# Patient Record
Sex: MALE | Race: WHITE | Employment: PART TIME | URBAN - NONMETROPOLITAN AREA
[De-identification: names, ages, dates, MRNs, and addresses within clinical notes are randomized per-mention and may not be internally consistent; named-entity substitution may affect disease eponyms.]

---

## 2017-01-23 RX ORDER — TADALAFIL 5 MG
5 TABLET ORAL DAILY
Qty: 30 TABLET | Refills: 3 | Status: SHIPPED | OUTPATIENT
Start: 2017-01-23 | End: 2019-11-20

## 2017-02-15 ENCOUNTER — TELEPHONE (OUTPATIENT)
Dept: UROLOGY | Age: 72
End: 2017-02-15

## 2017-03-14 ENCOUNTER — TELEPHONE (OUTPATIENT)
Dept: UROLOGY | Age: 72
End: 2017-03-14

## 2017-05-30 ENCOUNTER — TELEPHONE (OUTPATIENT)
Dept: UROLOGY | Age: 72
End: 2017-05-30

## 2017-06-15 ENCOUNTER — PROCEDURE VISIT (OUTPATIENT)
Dept: UROLOGY | Age: 72
End: 2017-06-15

## 2017-06-15 VITALS
WEIGHT: 156 LBS | BODY MASS INDEX: 23.64 KG/M2 | SYSTOLIC BLOOD PRESSURE: 154 MMHG | HEIGHT: 68 IN | DIASTOLIC BLOOD PRESSURE: 80 MMHG

## 2017-06-15 DIAGNOSIS — C61 PROSTATE CANCER (HCC): Primary | ICD-10-CM

## 2017-06-15 PROCEDURE — 99999 PR OFFICE/OUTPT VISIT,PROCEDURE ONLY: CPT | Performed by: UROLOGY

## 2017-06-15 PROCEDURE — 99999 PR SONO GUIDE NEEDLE BIOPSY: CPT | Performed by: UROLOGY

## 2017-06-15 PROCEDURE — 55876 PLACE RT DEVICE/MARKER PROS: CPT | Performed by: UROLOGY

## 2017-06-15 PROCEDURE — 96372 THER/PROPH/DIAG INJ SC/IM: CPT | Performed by: UROLOGY

## 2017-06-15 PROCEDURE — 76942 ECHO GUIDE FOR BIOPSY: CPT | Performed by: UROLOGY

## 2017-06-15 RX ORDER — GENTAMICIN SULFATE 40 MG/ML
80 INJECTION, SOLUTION INTRAMUSCULAR; INTRAVENOUS ONCE
Status: COMPLETED | OUTPATIENT
Start: 2017-06-15 | End: 2017-06-15

## 2017-06-15 RX ADMIN — GENTAMICIN SULFATE 80 MG: 40 INJECTION, SOLUTION INTRAMUSCULAR; INTRAVENOUS at 11:05

## 2017-07-17 ENCOUNTER — APPOINTMENT (OUTPATIENT)
Dept: RADIATION ONCOLOGY | Age: 72
End: 2017-07-17
Payer: COMMERCIAL

## 2017-07-17 ENCOUNTER — HOSPITAL ENCOUNTER (OUTPATIENT)
Dept: RADIATION ONCOLOGY | Age: 72
Discharge: HOME | End: 2017-07-17
Payer: COMMERCIAL

## 2017-07-17 PROCEDURE — 77385 HC NTSTY MODUL RAD TX DLVR SMPL: CPT | Performed by: RADIOLOGY

## 2017-07-17 PROCEDURE — 77336 RADIATION PHYSICS CONSULT: CPT | Performed by: RADIOLOGY

## 2017-07-18 ENCOUNTER — HOSPITAL ENCOUNTER (OUTPATIENT)
Dept: RADIATION ONCOLOGY | Age: 72
Discharge: HOME | End: 2017-07-18
Payer: COMMERCIAL

## 2017-07-18 PROCEDURE — 77385 HC NTSTY MODUL RAD TX DLVR SMPL: CPT | Performed by: RADIOLOGY

## 2017-07-19 ENCOUNTER — APPOINTMENT (OUTPATIENT)
Dept: RADIATION ONCOLOGY | Age: 72
End: 2017-07-19
Payer: COMMERCIAL

## 2017-07-19 PROCEDURE — 77385 HC NTSTY MODUL RAD TX DLVR SMPL: CPT | Performed by: RADIOLOGY

## 2017-07-20 ENCOUNTER — APPOINTMENT (OUTPATIENT)
Dept: RADIATION ONCOLOGY | Age: 72
End: 2017-07-20
Payer: COMMERCIAL

## 2017-07-20 PROCEDURE — 77385 HC NTSTY MODUL RAD TX DLVR SMPL: CPT | Performed by: RADIOLOGY

## 2017-07-21 ENCOUNTER — APPOINTMENT (OUTPATIENT)
Dept: RADIATION ONCOLOGY | Age: 72
End: 2017-07-21
Payer: COMMERCIAL

## 2017-07-21 PROCEDURE — 77385 HC NTSTY MODUL RAD TX DLVR SMPL: CPT | Performed by: RADIOLOGY

## 2017-07-24 ENCOUNTER — HOSPITAL ENCOUNTER (OUTPATIENT)
Dept: RADIATION ONCOLOGY | Age: 72
Discharge: HOME | End: 2017-07-24
Payer: COMMERCIAL

## 2017-07-24 PROCEDURE — 77336 RADIATION PHYSICS CONSULT: CPT | Performed by: RADIOLOGY

## 2017-07-24 PROCEDURE — 77385 HC NTSTY MODUL RAD TX DLVR SMPL: CPT | Performed by: RADIOLOGY

## 2017-07-25 PROCEDURE — 77385 HC NTSTY MODUL RAD TX DLVR SMPL: CPT | Performed by: RADIOLOGY

## 2017-07-26 PROCEDURE — 77385 HC NTSTY MODUL RAD TX DLVR SMPL: CPT | Performed by: RADIOLOGY

## 2017-07-27 PROCEDURE — 77385 HC NTSTY MODUL RAD TX DLVR SMPL: CPT | Performed by: RADIOLOGY

## 2017-07-28 PROCEDURE — 77385 HC NTSTY MODUL RAD TX DLVR SMPL: CPT | Performed by: RADIOLOGY

## 2017-07-31 ENCOUNTER — HOSPITAL ENCOUNTER (OUTPATIENT)
Dept: RADIATION ONCOLOGY | Age: 72
Discharge: HOME | End: 2017-07-31
Payer: COMMERCIAL

## 2017-07-31 PROCEDURE — 77336 RADIATION PHYSICS CONSULT: CPT | Performed by: RADIOLOGY

## 2017-07-31 PROCEDURE — 77385 HC NTSTY MODUL RAD TX DLVR SMPL: CPT | Performed by: RADIOLOGY

## 2017-08-01 PROCEDURE — 77300 RADIATION THERAPY DOSE PLAN: CPT | Performed by: RADIOLOGY

## 2017-08-01 PROCEDURE — 77385 HC NTSTY MODUL RAD TX DLVR SMPL: CPT | Performed by: RADIOLOGY

## 2017-08-02 PROCEDURE — 77385 HC NTSTY MODUL RAD TX DLVR SMPL: CPT | Performed by: RADIOLOGY

## 2017-08-03 PROCEDURE — 77385 HC NTSTY MODUL RAD TX DLVR SMPL: CPT | Performed by: RADIOLOGY

## 2017-08-04 PROCEDURE — 77385 HC NTSTY MODUL RAD TX DLVR SMPL: CPT | Performed by: RADIOLOGY

## 2017-08-07 ENCOUNTER — APPOINTMENT (OUTPATIENT)
Dept: RADIATION ONCOLOGY | Age: 72
End: 2017-08-07
Payer: COMMERCIAL

## 2017-08-07 ENCOUNTER — HOSPITAL ENCOUNTER (OUTPATIENT)
Dept: RADIATION ONCOLOGY | Age: 72
Discharge: HOME | End: 2017-08-07
Payer: COMMERCIAL

## 2017-08-07 PROCEDURE — 77338 DESIGN MLC DEVICE FOR IMRT: CPT | Performed by: RADIOLOGY

## 2017-08-07 PROCEDURE — 77336 RADIATION PHYSICS CONSULT: CPT | Performed by: RADIOLOGY

## 2017-08-07 PROCEDURE — 77385 HC NTSTY MODUL RAD TX DLVR SMPL: CPT | Performed by: RADIOLOGY

## 2017-08-08 ENCOUNTER — APPOINTMENT (OUTPATIENT)
Dept: RADIATION ONCOLOGY | Age: 72
End: 2017-08-08
Payer: COMMERCIAL

## 2017-08-08 PROCEDURE — 77385 HC NTSTY MODUL RAD TX DLVR SMPL: CPT | Performed by: RADIOLOGY

## 2017-08-09 ENCOUNTER — APPOINTMENT (OUTPATIENT)
Dept: RADIATION ONCOLOGY | Age: 72
End: 2017-08-09
Payer: COMMERCIAL

## 2017-08-09 PROCEDURE — 77385 HC NTSTY MODUL RAD TX DLVR SMPL: CPT | Performed by: RADIOLOGY

## 2017-08-10 ENCOUNTER — APPOINTMENT (OUTPATIENT)
Dept: RADIATION ONCOLOGY | Age: 72
End: 2017-08-10
Payer: COMMERCIAL

## 2017-08-10 PROCEDURE — 77385 HC NTSTY MODUL RAD TX DLVR SMPL: CPT | Performed by: RADIOLOGY

## 2017-08-11 ENCOUNTER — APPOINTMENT (OUTPATIENT)
Dept: RADIATION ONCOLOGY | Age: 72
End: 2017-08-11
Payer: COMMERCIAL

## 2017-08-11 PROCEDURE — 77385 HC NTSTY MODUL RAD TX DLVR SMPL: CPT | Performed by: RADIOLOGY

## 2017-08-14 ENCOUNTER — APPOINTMENT (OUTPATIENT)
Dept: RADIATION ONCOLOGY | Age: 72
End: 2017-08-14
Payer: COMMERCIAL

## 2017-08-14 ENCOUNTER — HOSPITAL ENCOUNTER (OUTPATIENT)
Dept: RADIATION ONCOLOGY | Age: 72
Discharge: HOME | End: 2017-08-14
Payer: COMMERCIAL

## 2017-08-14 PROCEDURE — 77385 HC NTSTY MODUL RAD TX DLVR SMPL: CPT | Performed by: RADIOLOGY

## 2017-08-14 PROCEDURE — 77336 RADIATION PHYSICS CONSULT: CPT | Performed by: RADIOLOGY

## 2017-08-15 ENCOUNTER — APPOINTMENT (OUTPATIENT)
Dept: RADIATION ONCOLOGY | Age: 72
End: 2017-08-15
Payer: COMMERCIAL

## 2017-08-15 PROCEDURE — 77385 HC NTSTY MODUL RAD TX DLVR SMPL: CPT | Performed by: RADIOLOGY

## 2017-08-16 ENCOUNTER — APPOINTMENT (OUTPATIENT)
Dept: RADIATION ONCOLOGY | Age: 72
End: 2017-08-16
Payer: COMMERCIAL

## 2017-08-16 PROCEDURE — 77385 HC NTSTY MODUL RAD TX DLVR SMPL: CPT | Performed by: RADIOLOGY

## 2017-08-17 ENCOUNTER — APPOINTMENT (OUTPATIENT)
Dept: RADIATION ONCOLOGY | Age: 72
End: 2017-08-17
Payer: COMMERCIAL

## 2017-08-18 ENCOUNTER — APPOINTMENT (OUTPATIENT)
Dept: RADIATION ONCOLOGY | Age: 72
End: 2017-08-18
Payer: COMMERCIAL

## 2017-08-21 ENCOUNTER — APPOINTMENT (OUTPATIENT)
Dept: RADIATION ONCOLOGY | Age: 72
End: 2017-08-21
Payer: COMMERCIAL

## 2017-08-21 ENCOUNTER — HOSPITAL ENCOUNTER (OUTPATIENT)
Dept: RADIATION ONCOLOGY | Age: 72
Discharge: HOME | End: 2017-08-21
Payer: COMMERCIAL

## 2017-08-21 PROCEDURE — 77385 HC NTSTY MODUL RAD TX DLVR SMPL: CPT | Performed by: RADIOLOGY

## 2017-08-22 ENCOUNTER — APPOINTMENT (OUTPATIENT)
Dept: RADIATION ONCOLOGY | Age: 72
End: 2017-08-22
Payer: COMMERCIAL

## 2017-08-22 PROCEDURE — 77385 HC NTSTY MODUL RAD TX DLVR SMPL: CPT | Performed by: RADIOLOGY

## 2017-08-23 ENCOUNTER — APPOINTMENT (OUTPATIENT)
Dept: RADIATION ONCOLOGY | Age: 72
End: 2017-08-23
Payer: COMMERCIAL

## 2017-08-23 PROCEDURE — 77385 HC NTSTY MODUL RAD TX DLVR SMPL: CPT | Performed by: RADIOLOGY

## 2017-08-24 ENCOUNTER — APPOINTMENT (OUTPATIENT)
Dept: RADIATION ONCOLOGY | Age: 72
End: 2017-08-24
Payer: COMMERCIAL

## 2017-08-24 PROCEDURE — 77385 HC NTSTY MODUL RAD TX DLVR SMPL: CPT | Performed by: RADIOLOGY

## 2017-08-25 PROCEDURE — 77385 HC NTSTY MODUL RAD TX DLVR SMPL: CPT | Performed by: RADIOLOGY

## 2017-08-25 PROCEDURE — 77336 RADIATION PHYSICS CONSULT: CPT | Performed by: RADIOLOGY

## 2017-10-03 ENCOUNTER — HOSPITAL ENCOUNTER (OUTPATIENT)
Dept: RADIATION ONCOLOGY | Age: 72
Discharge: HOME | End: 2017-10-03
Payer: COMMERCIAL

## 2017-10-03 PROCEDURE — 99211 OFF/OP EST MAY X REQ PHY/QHP: CPT

## 2018-04-02 ENCOUNTER — HOSPITAL ENCOUNTER (OUTPATIENT)
Dept: RADIATION ONCOLOGY | Age: 73
Discharge: HOME | End: 2018-04-02
Payer: COMMERCIAL

## 2018-04-02 PROCEDURE — 99211 OFF/OP EST MAY X REQ PHY/QHP: CPT

## 2018-05-16 ENCOUNTER — OFFICE VISIT (OUTPATIENT)
Dept: FAMILY MEDICINE CLINIC | Age: 73
End: 2018-05-16
Payer: COMMERCIAL

## 2018-05-16 VITALS
SYSTOLIC BLOOD PRESSURE: 138 MMHG | RESPIRATION RATE: 12 BRPM | HEIGHT: 68 IN | BODY MASS INDEX: 24.89 KG/M2 | WEIGHT: 164.2 LBS | DIASTOLIC BLOOD PRESSURE: 70 MMHG | HEART RATE: 80 BPM

## 2018-05-16 DIAGNOSIS — C61 PROSTATE CANCER (HCC): ICD-10-CM

## 2018-05-16 DIAGNOSIS — Z00.00 ENCOUNTER FOR ANNUAL PHYSICAL EXAM: Primary | ICD-10-CM

## 2018-05-16 DIAGNOSIS — Z23 NEED FOR PNEUMOCOCCAL VACCINATION: ICD-10-CM

## 2018-05-16 DIAGNOSIS — I10 ESSENTIAL HYPERTENSION: ICD-10-CM

## 2018-05-16 LAB
BILIRUBIN URINE: NEGATIVE
BLOOD URINE, POC: NEGATIVE
CHARACTER, URINE: CLEAR
COLOR, URINE: YELLOW
GLUCOSE URINE: NEGATIVE MG/DL
KETONES, URINE: NEGATIVE
LEUKOCYTE CLUMPS, URINE: NEGATIVE
NITRITE, URINE: NEGATIVE
PH, URINE: 6
PROTEIN, URINE: NEGATIVE MG/DL
SPECIFIC GRAVITY, URINE: 1.02 (ref 1–1.03)
UROBILINOGEN, URINE: 0.2 EU/DL

## 2018-05-16 PROCEDURE — 81003 URINALYSIS AUTO W/O SCOPE: CPT | Performed by: NURSE PRACTITIONER

## 2018-05-16 PROCEDURE — 90471 IMMUNIZATION ADMIN: CPT | Performed by: NURSE PRACTITIONER

## 2018-05-16 PROCEDURE — 99397 PER PM REEVAL EST PAT 65+ YR: CPT | Performed by: NURSE PRACTITIONER

## 2018-05-16 PROCEDURE — 90670 PCV13 VACCINE IM: CPT | Performed by: NURSE PRACTITIONER

## 2018-05-16 RX ORDER — BICALUTAMIDE 50 MG/1
50 TABLET, FILM COATED ORAL DAILY
COMMUNITY
End: 2019-11-20

## 2018-05-16 ASSESSMENT — PATIENT HEALTH QUESTIONNAIRE - PHQ9
SUM OF ALL RESPONSES TO PHQ QUESTIONS 1-9: 0
SUM OF ALL RESPONSES TO PHQ9 QUESTIONS 1 & 2: 0
1. LITTLE INTEREST OR PLEASURE IN DOING THINGS: 0
2. FEELING DOWN, DEPRESSED OR HOPELESS: 0

## 2018-10-18 ENCOUNTER — HOSPITAL ENCOUNTER (OUTPATIENT)
Dept: RADIATION ONCOLOGY | Age: 73
Discharge: HOME | End: 2018-10-18
Payer: COMMERCIAL

## 2018-10-18 PROCEDURE — 99211 OFF/OP EST MAY X REQ PHY/QHP: CPT

## 2019-05-03 ENCOUNTER — HOSPITAL ENCOUNTER (OUTPATIENT)
Dept: RADIATION ONCOLOGY | Age: 74
Discharge: HOME | End: 2019-05-03
Payer: COMMERCIAL

## 2019-05-03 PROCEDURE — 99211 OFF/OP EST MAY X REQ PHY/QHP: CPT | Performed by: NURSE PRACTITIONER

## 2019-05-03 NOTE — PROGRESS NOTES
[address] [facility] [address], [address]  Phone: [phone]   Toll Free: [phone]   Fax: [phone]    RADIATION ONCOLOGY FOLLOW UP REPORT    PATIENT NAME:  Adiel Thornton     : 1945  MEDICAL RECORD NO: [id number]    LOCATION: [facility] NO: [id number]      PROVIDER: AXEL Tobar CNP        DATE OF SERVICE: 5/3/2019    FOLLOW UP PHYSICIANS: Dr. Kellogg ([facility]), Dr. Leandro Torres CNP     DIAGNOSIS: C61 adenocarcinoma of the prostate, [id number], Stage III, PSA at diagnosis 11.22, Thomas score 4+3=7, with extraprostatic extension, perineural invasion, positive margin and detectable and variable PSA     DATE OF DIAGNOSIS: 2015     END OF TREATMENT DATE: 2017     ECOG PERFORMANCE STATUS: 0     PAIN: Denies     CHAPERONE: Declined        HPI: Palomo is an active 73year old male who had an elevated PSA in  of 11.22. A biopsy was recommended and completed 2015.  This showed adenocarcinoma the prostate Thomas 3+4 = 7 involving 9/12 cores including all core specimens from the left prostate and 3/6 cores from the right prostate. Elmo Ko received a recommendation for a laparoscopic robotic prostatectomy which was performed 2015.  Path revealed extraprostatic extension and positive surgical margins. Four right pelvic lymph nodes and 5 left pelvic lymph nodes were sampled all which were negative for malignancy.  Postoperative PSA was detectable 0.05. The PSA had been quite variable since that time, measuring as low as 0.02 and as high as 0.09.  PSA from 3/15/2017 was 0.08, pretreatment PSA 2017 was 0.05.  Palomo received a recommendation for salvage radiation therapy.  After discussion about the role of radiation therapy for his prostate cancer Kathryn Mckeon decided to undergo treatment. He did not have any unexpected side effects related to his radiation treatment.       INTERVAL HISTORY: Elmo Ko returns to [facility] today for a post treatment check up as part of his survivorship care after undergoing salvage EBRT for prostate cancer. Jessica Ryan states he has been doing well since completing treatment. He continues to have discomfort in the right hip. He has seen doctors at [facility] and [facility] about hip replacement, he has received some conflicting information between the 2 places and is considering another opinion prior to making any decisions. He states he continues to struggle with his energy level and is hoping it will improve once his testosterone level rises. In regards to urination he admits to having nocturia twice per night, denies leaking. He states the urgency and frequency fluctuates day to day some days are better than others. He denies blood or pain with urination or bowel movements. He voiced the erectile dysfunction continues to be a concern for him. He has tried oral medications for this which have not been very effective. He has also tried trimix and did have some results with this but did not like using it. He plans to discuss any other options with Dr. Anthony Ramos in June at his follow up. AUA Score: 11 (Moderate)  Quality of Life: 2 (Mostly satisfied)  RYAN Inventory: 1 (Severe ED)      TESTS:   12/12/18 PSA: <0.04  7/18/18 PSA: <0.04  3/28/18 PSA: <0.04      MEDICATIONS:   Current Outpatient Medications   Medication Sig Dispense Refill    bicalutamide (CASODEX) 50 MG chemo tablet Take 50 mg by mouth daily      CIALIS 5 MG tablet Take 1 tablet by mouth daily 30 tablet 3    sildenafil (VIAGRA) 50 MG tablet Take 2 tablets by mouth as needed for Erectile Dysfunction 3 tablet 0    Multiple Vitamins-Minerals (MULTIVITAMIN PO) Take by mouth       No current facility-administered medications for this encounter. ROS: As noted in the HPI and Interval history, otherwise negative. EXAMINATION:   GENERAL: Jessica Ryan is a pleasant well developed adult male.  He is alert and oriented x3, in no acute distress. VITAL SIGNS:  Weight 74.8 kg, temperature 35.8°C, pulse 62, blood pressure 143/71, respirations 18, pulse ox 97% on room air. HEENT: Normocephalic, atraumatic. PERRL, EOMI. Ears, nose and lips within normal limits. Oropharynx unremarkable. NECK: Without palpable adenopathy. Supple  LYMPH: Without palpable supraclavicular or axillary adenopathy. LUNGS: Clear throughout no adventitious sounds. HEART: Regular rate and rhythm. ABDOMEN: Soft, nontender. Active bowel sounds x4  EXTREMITIES: Without clubbing or cyanosis. NEUROLOGIC EXAMINATION: Cranial nerves grossly intact. : Without CVA tenderness. SKIN: Skin in treated area without breakdown, erythema or dryness. MUSCULOSKELETAL: Gait steady, without bony point tenderness. ASSESSMENT AND PLAN: Taylor Chavira continues to be doing well since completing radiation treatment. He is s/p RALRP in 2015. With an increasing PSA in 2017 when he started on ADT and underwent XRT.      He received last Eligard injection in July 2018. He stopped taking Casodex as directed on October 1 2018. His testosterone level in December was 56. He continues to struggle with ED and has not had success with oral treatment for this. He has tried trimix with some success but did not like using it. Taylor Chavira states he as the urge to want to perform but the functionality is the problem. He plans to discuss this further with Dr. Cordell Morel at his visit in June. In regards to his prostate cancer at this time his PSA remains undetectable at <0.04. This demonstrates good control of the cancer at this time. His next PSA is scheduled in June prior to appointment with Dr. Cordell Morel. Plan:  1. PSA in December remains stable at <0.04. Next PSA scheduled for June. 2. He has completed ADT: eligard last injection was in July, 2018 and casodex stopped October 1st, 2018. 3. Has an appointment at [facility] in June with repeat PSA and testosterone levels.    4. ED: follows with Urology at [facility]. Has tried multiple treatment modalities but has not had much success with them. Plans to discuss further with [facility] in June. 5. Right hip pain: has seen ortho feel this is due to previous hip replacement 17 years ago and may need replacing. Looking for another opinion has he has received 2 different opinions from [facility]note 3] and [facility]. 6. Continue follow up as scheduled with other providers. 7. Follow up here in September to offset appointment with [facility]. Nany Addison is aware he can call for any questions, concerns or changes in condition prior to his next visit.                Electronically signed by AXEL Nino CNP on 5/3/19 at 1:34 PM

## 2019-09-12 ENCOUNTER — HOSPITAL ENCOUNTER (OUTPATIENT)
Dept: RADIATION ONCOLOGY | Age: 74
Discharge: HOME | End: 2019-09-12
Payer: COMMERCIAL

## 2019-09-12 VITALS
RESPIRATION RATE: 16 BRPM | TEMPERATURE: 96.4 F | DIASTOLIC BLOOD PRESSURE: 90 MMHG | HEART RATE: 71 BPM | BODY MASS INDEX: 25.64 KG/M2 | SYSTOLIC BLOOD PRESSURE: 153 MMHG | OXYGEN SATURATION: 96 % | WEIGHT: 166.23 LBS

## 2019-09-12 PROCEDURE — 99212 OFFICE O/P EST SF 10 MIN: CPT | Performed by: NURSE PRACTITIONER

## 2019-09-12 NOTE — PROGRESS NOTES
[place] a post treatment check up as part of his survivorship care after undergoing salvage EBRT for prostate cancer. Delta Nascimento states he continues to be doing well since completing treatment. He does admit to continued issues with fatigue. Discussed following with physical therapy to help with this he would like to discuss it with his wife first. He continues to have issues with pain tot he right hip and is scheduled to undergo right hip replacement on December 2nd. In regards to urination he admits to having nocturia twice per night, denies leaking. He states the urgency and frequency fluctuates day to day some days are better than others. He denies blood or pain with urination or bowel movements. He voiced the erectile dysfunction continues to be a concern for him. He has tried oral medications for this which have not been very effective. He has also tried trimix and did have some results with this but did not like using it. AUA Score: 6 (Mild)  Quality of Life: 2 (Mostly satisfied)  RYAN Inventory: 1 (Severe ED)    LAB RESULTS:   PSA since completing radiation treatment:   6/19/19: <0.04  12/12/18: <0.04  7/11/18: <0.04  3/28/18: <0.04  8/30/17: <0.04    Testosterone levels:   6/19/49: 373  12/12/18: 56  8/30/17: 36    RADIOLOGY RESULTS:   7/1/19: CT Right lower extremity:   IMPRESSION:     Exam is limited by metal artifact.     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     Chronic comminuted ununited fractures of the right inferior pubic ramus.       7/23/19: CT Pelvis:   IMPRESSION:     1.  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  Right side is complicated with periprosthetic     fracture (inferior to the acetabular cup).         MEDICATIONS:   Current Outpatient Medications   Medication Sig Dispense Refill    bicalutamide (CASODEX) 50 MG chemo tablet Take 50 mg by mouth daily      CIALIS 5 MG tablet Take 1 tablet by mouth daily 30 tablet 3    sildenafil (VIAGRA) 50 MG tablet Take 2 tablets by mouth as needed for Erectile Dysfunction 3 tablet 0    Multiple Vitamins-Minerals (MULTIVITAMIN PO) Take by mouth       No current facility-administered medications for this encounter. ROS: As noted in the HPI and Interval history, otherwise negative. EXAMINATION:   GENERAL: Joe Mazariegos is a pleasant well developed adult male. He is alert and oriented x3, in no acute distress. VITAL SIGNS:  Weight 74.8 kg, temperature 35.8°C, pulse 62, blood pressure 143/71, respirations 18, pulse ox 97% on room air. HEENT: Normocephalic, atraumatic. PERRL, EOMI. Ears, nose and lips within normal limits. Oropharynx unremarkable. NECK: Without palpable adenopathy. Supple  LYMPH: Without palpable supraclavicular or axillary adenopathy. LUNGS: Clear throughout no adventitious sounds. HEART: Regular rate and rhythm. ABDOMEN: Soft, nontender. Active bowel sounds x4  EXTREMITIES: Without clubbing or cyanosis. NEUROLOGIC EXAMINATION: Cranial nerves grossly intact. : Without CVA tenderness. SKIN: Skin in treated area without breakdown, erythema or dryness. MUSCULOSKELETAL: Gait steady, without bony point tenderness.             ASSESSMENT AND PLAN: Joe Mazariegos continues to be doing well since completing radiation treatment. He is s/p RALRP in 2015. With an increasing PSA in 2017 when he started on ADT and underwent XRT.       He received last Eligard injection in July 2018. He stopped taking Casodex as directed on October 1 2018. His testosterone level in December was 64 and most recently increased to 373 in June 2019. He continues to struggle with ED and has not had success with oral treatment for this.  He has tried trimix

## 2019-11-20 ENCOUNTER — OFFICE VISIT (OUTPATIENT)
Dept: FAMILY MEDICINE CLINIC | Age: 74
End: 2019-11-20
Payer: COMMERCIAL

## 2019-11-20 ENCOUNTER — TELEPHONE (OUTPATIENT)
Dept: FAMILY MEDICINE CLINIC | Age: 74
End: 2019-11-20

## 2019-11-20 ENCOUNTER — NURSE ONLY (OUTPATIENT)
Dept: LAB | Age: 74
End: 2019-11-20

## 2019-11-20 ENCOUNTER — HOSPITAL ENCOUNTER (OUTPATIENT)
Age: 74
Discharge: HOME | End: 2019-11-20
Payer: COMMERCIAL

## 2019-11-20 VITALS
DIASTOLIC BLOOD PRESSURE: 88 MMHG | RESPIRATION RATE: 10 BRPM | HEART RATE: 72 BPM | BODY MASS INDEX: 26.37 KG/M2 | SYSTOLIC BLOOD PRESSURE: 132 MMHG | WEIGHT: 168 LBS | HEIGHT: 67 IN

## 2019-11-20 DIAGNOSIS — I48.91 ATRIAL FIBRILLATION, UNSPECIFIED TYPE (HCC): Primary | ICD-10-CM

## 2019-11-20 DIAGNOSIS — M16.10 ARTHRITIS, HIP: ICD-10-CM

## 2019-11-20 DIAGNOSIS — M16.10 ARTHRITIS, HIP: Primary | ICD-10-CM

## 2019-11-20 DIAGNOSIS — Z01.818 PRE-OP EXAM: ICD-10-CM

## 2019-11-20 LAB
ANION GAP SERPL CALCULATED.3IONS-SCNC: 11 MEQ/L (ref 8–16)
APTT: 36.2 SECONDS (ref 22–38)
AVERAGE GLUCOSE: 96 MG/DL (ref 70–126)
BASOPHILS # BLD: 0.7 %
BASOPHILS ABSOLUTE: 0 THOU/MM3 (ref 0–0.1)
BUN BLDV-MCNC: 16 MG/DL (ref 7–22)
CALCIUM SERPL-MCNC: 9.2 MG/DL (ref 8.5–10.5)
CHLORIDE BLD-SCNC: 103 MEQ/L (ref 98–111)
CO2: 26 MEQ/L (ref 23–33)
CREAT SERPL-MCNC: 0.8 MG/DL (ref 0.4–1.2)
EKG ATRIAL RATE: 576 BPM
EKG Q-T INTERVAL: 448 MS
EKG QRS DURATION: 80 MS
EKG QTC CALCULATION (BAZETT): 428 MS
EKG R AXIS: 32 DEGREES
EKG T AXIS: 21 DEGREES
EKG VENTRICULAR RATE: 55 BPM
EOSINOPHIL # BLD: 2.5 %
EOSINOPHILS ABSOLUTE: 0.1 THOU/MM3 (ref 0–0.4)
ERYTHROCYTE [DISTWIDTH] IN BLOOD BY AUTOMATED COUNT: 12.9 % (ref 11.5–14.5)
ERYTHROCYTE [DISTWIDTH] IN BLOOD BY AUTOMATED COUNT: 42.4 FL (ref 35–45)
GFR SERPL CREATININE-BSD FRML MDRD: > 90 ML/MIN/1.73M2
GLUCOSE BLD-MCNC: 84 MG/DL (ref 70–108)
HBA1C MFR BLD: 5.2 % (ref 4.4–6.4)
HCT VFR BLD CALC: 46.9 % (ref 42–52)
HEMOGLOBIN: 14.9 GM/DL (ref 14–18)
IMMATURE GRANS (ABS): 0.02 THOU/MM3 (ref 0–0.07)
IMMATURE GRANULOCYTES: 0.4 %
INR BLD: 1.03 (ref 0.85–1.13)
LYMPHOCYTES # BLD: 13.1 %
LYMPHOCYTES ABSOLUTE: 0.7 THOU/MM3 (ref 1–4.8)
MCH RBC QN AUTO: 28.8 PG (ref 26–33)
MCHC RBC AUTO-ENTMCNC: 31.8 GM/DL (ref 32.2–35.5)
MCV RBC AUTO: 90.7 FL (ref 80–94)
MONOCYTES # BLD: 9.7 %
MONOCYTES ABSOLUTE: 0.5 THOU/MM3 (ref 0.4–1.3)
NUCLEATED RED BLOOD CELLS: 0 /100 WBC
PLATELET # BLD: 189 THOU/MM3 (ref 130–400)
PMV BLD AUTO: 9.6 FL (ref 9.4–12.4)
POTASSIUM SERPL-SCNC: 4.6 MEQ/L (ref 3.5–5.2)
RBC # BLD: 5.17 MILL/MM3 (ref 4.7–6.1)
SEG NEUTROPHILS: 73.6 %
SEGMENTED NEUTROPHILS ABSOLUTE COUNT: 4.1 THOU/MM3 (ref 1.8–7.7)
SODIUM BLD-SCNC: 140 MEQ/L (ref 135–145)
WBC # BLD: 5.6 THOU/MM3 (ref 4.8–10.8)

## 2019-11-20 PROCEDURE — 99214 OFFICE O/P EST MOD 30 MIN: CPT | Performed by: NURSE PRACTITIONER

## 2019-11-20 PROCEDURE — 93005 ELECTROCARDIOGRAM TRACING: CPT | Performed by: NURSE PRACTITIONER

## 2019-11-20 ASSESSMENT — PATIENT HEALTH QUESTIONNAIRE - PHQ9
SUM OF ALL RESPONSES TO PHQ9 QUESTIONS 1 & 2: 0
SUM OF ALL RESPONSES TO PHQ QUESTIONS 1-9: 0
1. LITTLE INTEREST OR PLEASURE IN DOING THINGS: 0
2. FEELING DOWN, DEPRESSED OR HOPELESS: 0
SUM OF ALL RESPONSES TO PHQ QUESTIONS 1-9: 0

## 2020-02-27 ENCOUNTER — APPOINTMENT (OUTPATIENT)
Dept: GENERAL RADIOLOGY | Age: 75
End: 2020-02-27
Payer: MEDICARE

## 2020-02-27 ENCOUNTER — HOSPITAL ENCOUNTER (EMERGENCY)
Age: 75
Discharge: HOME | End: 2020-02-27
Attending: EMERGENCY MEDICINE
Payer: MEDICARE

## 2020-02-27 VITALS
DIASTOLIC BLOOD PRESSURE: 89 MMHG | BODY MASS INDEX: 25.01 KG/M2 | RESPIRATION RATE: 16 BRPM | TEMPERATURE: 98 F | OXYGEN SATURATION: 96 % | HEART RATE: 60 BPM | WEIGHT: 165 LBS | SYSTOLIC BLOOD PRESSURE: 143 MMHG | HEIGHT: 68 IN

## 2020-02-27 PROCEDURE — 6360000002 HC RX W HCPCS: Performed by: EMERGENCY MEDICINE

## 2020-02-27 PROCEDURE — 73502 X-RAY EXAM HIP UNI 2-3 VIEWS: CPT

## 2020-02-27 PROCEDURE — 27250 TREAT HIP DISLOCATION: CPT

## 2020-02-27 PROCEDURE — 94761 N-INVAS EAR/PLS OXIMETRY MLT: CPT

## 2020-02-27 PROCEDURE — 94770 HC ETCO2 MONITOR DAILY: CPT

## 2020-02-27 PROCEDURE — 2709999900 HC NON-CHARGEABLE SUPPLY

## 2020-02-27 PROCEDURE — 96376 TX/PRO/DX INJ SAME DRUG ADON: CPT

## 2020-02-27 PROCEDURE — 96375 TX/PRO/DX INJ NEW DRUG ADDON: CPT

## 2020-02-27 PROCEDURE — 2700000000 HC OXYGEN THERAPY PER DAY

## 2020-02-27 PROCEDURE — 99285 EMERGENCY DEPT VISIT HI MDM: CPT

## 2020-02-27 PROCEDURE — 96374 THER/PROPH/DIAG INJ IV PUSH: CPT

## 2020-02-27 RX ORDER — FENTANYL CITRATE 50 UG/ML
50 INJECTION, SOLUTION INTRAMUSCULAR; INTRAVENOUS EVERY 30 MIN PRN
Status: COMPLETED | OUTPATIENT
Start: 2020-02-27 | End: 2020-02-27

## 2020-02-27 RX ORDER — PROPOFOL 10 MG/ML
INJECTION, EMULSION INTRAVENOUS DAILY PRN
Status: COMPLETED | OUTPATIENT
Start: 2020-02-27 | End: 2020-02-27

## 2020-02-27 RX ORDER — PROPOFOL 10 MG/ML
1 INJECTION, EMULSION INTRAVENOUS ONCE
Status: DISCONTINUED | OUTPATIENT
Start: 2020-02-27 | End: 2020-02-28 | Stop reason: HOSPADM

## 2020-02-27 RX ORDER — PROPOFOL 10 MG/ML
INJECTION, EMULSION INTRAVENOUS
Status: DISCONTINUED
Start: 2020-02-27 | End: 2020-02-28 | Stop reason: HOSPADM

## 2020-02-27 RX ADMIN — FENTANYL CITRATE 50 MCG: 50 INJECTION, SOLUTION INTRAMUSCULAR; INTRAVENOUS at 20:25

## 2020-02-27 RX ADMIN — PROPOFOL 75 MG: 10 INJECTION, EMULSION INTRAVENOUS at 20:25

## 2020-02-27 RX ADMIN — PROPOFOL 75 MG: 10 INJECTION, EMULSION INTRAVENOUS at 20:20

## 2020-02-27 RX ADMIN — FENTANYL CITRATE 50 MCG: 50 INJECTION, SOLUTION INTRAMUSCULAR; INTRAVENOUS at 20:15

## 2020-02-27 ASSESSMENT — ENCOUNTER SYMPTOMS
BACK PAIN: 0
DIARRHEA: 0
ABDOMINAL PAIN: 0
BLOOD IN STOOL: 0
SHORTNESS OF BREATH: 0
WHEEZING: 0
COUGH: 0
VOMITING: 0
SORE THROAT: 0
NAUSEA: 0

## 2020-02-27 ASSESSMENT — PAIN DESCRIPTION - LOCATION: LOCATION: HIP

## 2020-02-27 ASSESSMENT — PAIN DESCRIPTION - PAIN TYPE: TYPE: ACUTE PAIN

## 2020-02-27 ASSESSMENT — PAIN SCALES - GENERAL
PAINLEVEL_OUTOF10: 2

## 2020-02-27 ASSESSMENT — PAIN DESCRIPTION - ORIENTATION: ORIENTATION: RIGHT

## 2020-02-27 NOTE — CONSULTS
05/27/2016     PT/INR:    Lab Results   Component Value Date    INR 1.03 11/20/2019     Troponin:  No results found for: TROPONINI  No results for input(s): LIPASE, AMYLASE in the last 72 hours. No results for input(s): AST, ALT, BILIDIR, BILITOT, ALKPHOS in the last 72 hours. Radiology:   Xr Hip 2-3 Vw W Pelvis Right    Result Date: 2/27/2020  PROCEDURE: XR HIP 2-3 VW W PELVIS RIGHT CLINICAL INFORMATION: right hip possible dislocation . COMPARISON: No prior study. TECHNIQUE: Single view pelvis and 2 views of the right hip. FINDINGS: No acute fracture. Superior lateral dislocation of the right humeral head and prosthesis relative to the acetabular component. Old pubic rami fractures on the right. Generalized osteopenia. Left hip prosthesis appears in anatomic alignment. Stool in the visualized colon. Vascular calcifications. Soft tissues are otherwise unremarkable. IMPRESSION: 1. No acute fracture. Superior lateral dislocation of the right humeral head and prosthesis relative to the acetabular component. **This report has been created using voice recognition software. It may contain minor errors which are inherent in voice recognition technology. ** Final report electronically signed by Dr. Cristy Hunt on 2/27/2020 6:11 PM      ASSESSMENT:Active Problems:    * No active hospital problems. *  Resolved Problems:    * No resolved hospital problems. *     Right total hip prosthesis dislocation    PLAN as discussed with Dr. Beatrice Rosenthal:  Plan for closed reduction of the right hip under conscious sedation. Please refer to Dr. Christopher Javier note.   -may DC once awake and stable  -recommend FU with his surgeon in [facility]. -FU with Dr. Beatrice Rosenthal prn.      Electronically signed by AXEL Kelley - CNP on 2/27/2020 at 6:50 PM

## 2020-02-28 NOTE — CONSULTS
disturbance or numbness/tingling  All other systems reviewed and are negative    PHYSICAL EXAM:  /79   Pulse 70   Temp 98 °F (36.7 °C) (Oral)   Resp 15   Ht 5' 8\" (1.727 m)   Wt 165 lb (74.8 kg)   SpO2 95%   BMI 25.09 kg/m²   GENERAL APPEARANCE: Awake and oriented x3. No acute distress, except appropriate to injury. MOOD AND AFFECT: Calm appropriate to situation  GAIT AND STATION: Patient is in bed and unable to ambulate secondary to known injury. COORDINATION and BALANCE: Patient is grossly coordinated unable to ambulate secondary to known injury. Right Lower Extremity:  Shortened and externally rotated. Pain with log roll. Healed posterolateral hip incision. Fullness palpated in superolateral hip. Knee and ankle are benign. Motors ankle and toes well. Palpable DP and PT pulse. Sensation intact to light touch in dorsal and plantar foot. Labs:   CBC:   Lab Results   Component Value Date    WBC 5.6 11/20/2019    RBC 5.17 11/20/2019    RBC 4.38 07/26/2017    HEMOGLOBIN 14.3 11/01/2011     BMP:  Lab Results   Component Value Date    GLUCOSE 84 11/20/2019    GLUCOSE 91 05/27/2016    CO2 26 11/20/2019    BUN 16 11/20/2019    CREATININE 0.8 11/20/2019    CALCIUM 9.2 11/20/2019     PT/INR:   Lab Results   Component Value Date    INR 1.03 11/20/2019    APTT 36.2 11/20/2019     HgBA1c:    Lab Results   Component Value Date    LABA1C 5.2 11/20/2019       Radiology:   XR: Pre-reduction right hip xrays demonstrate an anterosuperior prosthetic hip dislocation in the setting of a revision acetabular cup with iliac flange augment and a femoral stem without obvious loosening. No acute fractures noted. Post-reduction xrays demonstrate successful reduction of the hip prosthesis. ASSESSMENT:  69 y.o. male who is 3 months s/p right revision hip arthroplasty by Dr. Jamaica Khan at [facility] who sustained a right anterosuperior prosthetic hip dislocation.     PLAN:  -I recommended closed reduction right hip in the ED under conscious sedation. See my separate procedure note.  -Ok to DC home after reduction with follow up next week with his surgery  -WBAT  -No abduction pillow because this was an anterior dislocation.     [facility]  2/27/2020  9:58 PM

## 2020-02-28 NOTE — ED NOTES
Dr. Penny Glez, Dr. Angel Yuen RT, pharmacy, Will RN, and this nurse in room. Procedure explained by Dr. Penny Glez to pt.      Moise Morales RN  02/27/20 [id number]

## 2020-02-28 NOTE — ED NOTES
Oxygen saturation 78%.  Shannan Amezquita RT began bagging pt and oxygen saturation ebony to 99%     Graciela Michael RN  02/27/20 2030

## 2020-02-28 NOTE — PROCEDURES
Procedure: right prosthetic hip closed reduction    Pre-procedure Diagnosis: right anterosuperior prosthetic hip dislocation    Post-procedure Diagnosis: Same    Details: The patient was consented for closed reduction right hip. Conscious sedation was performed by the ED physician. When the patient was sedated, the right hip was flexed and axial traction was applied. The hip was then internally rotated and a palpable reduction was appreciated. Leg lengths were restored. Post-reduction images confirmed successful reduction. The patient tolerated the procedure well without complications.     Gill Banerjee  2/27/2020  10:00 PM

## 2020-02-28 NOTE — ED NOTES
First presentation to the patient. He is sitting comfortably in bed with minimal pain. Informed consent was obtained. Ortho PA at bedside explaining procedure.       Melisa Stuart RN  02/27/20 1942

## 2020-02-28 NOTE — ED PROVIDER NOTES
[address]         Pt Name: Charlotte Coe  MRN: [id number]  [place] 1945  Date of evaluation: 2/27/2020  Provider: Adithya Gardiner MD    CHIEF COMPLAINT       Chief Complaint   Patient presents with    Hip Pain       Nurses Notes reviewed and I agree except as noted in the HPI. HISTORY OF PRESENT ILLNESS    Charlotte Coe is a 69 y.o. male who presents for evaluation of right hip pain. The patient was leaning against a railing when he went to move and stand he was unable to bear weight on the right. He uses incline earlier today as usual.  His pain is 2 on a scale 0-10 but he cannot bear weight or move his right hip easily. No other injuries elicited suspected or offered for exam.  Patient has a history of bilateral hip prosthesis. He recently had a right hip prosthesis and acetabular revision of [place] right hip last year [place]. This HPI was provided by the patient. REVIEW OF SYSTEMS     Review of Systems   Constitutional: Negative for chills and fever. HENT: Negative for congestion, ear pain and sore throat. Eyes: Negative for visual disturbance. Respiratory: Negative for cough, shortness of breath and wheezing. Cardiovascular: Negative for chest pain, palpitations and leg swelling. Gastrointestinal: Negative for abdominal pain, blood in stool, diarrhea, nausea and vomiting. Genitourinary: Negative for dysuria and hematuria. Musculoskeletal: Positive for arthralgias (Right hip). Negative for back pain, joint swelling and neck pain. Skin: Negative for rash. Allergic/Immunologic: Negative for environmental allergies. Neurological: Positive for weakness. Negative for dizziness, syncope and headaches. Hematological: Does not bruise/bleed easily. Psychiatric/Behavioral: Negative for confusion and dysphoric mood. The patient is not nervous/anxious.     All other systems reviewed and are software. It may contain minor errors which are inherent in voice recognition technology. **      Final report electronically signed by Dr. Anthony Hathaway on 2/27/2020 6:11 PM          [x] Visualized and interpreted by me   [x] Radiologist's Wet Read Report Reviewed   [] Discussed with Radiologist.    EMERGENCY DEPARTMENT COURSE:   Vitals:    Vitals:    02/27/20 2038 02/27/20 2038 02/27/20 2042 02/27/20 2045   BP:  103/69 (!) 117/91 120/79   Pulse:  65 70    Resp: 16 15 15    Temp:       TempSrc:       SpO2: 99% 97% 95%    Weight:       Height:           Orders Placed This Encounter   Medications    fentaNYL (SUBLIMAZE) injection 50 mcg    propofol 200 MG/20ML injection     Divine Ben[place]ides: cabinet override    propofol injection 75 mg    propofol injection       Patient was seen and evaluated emergency department. History and physical were completed. Diagnostic imaging studies are reviewed. Workup confirms a dislocated right hip prosthesis. The patient was given IV fluids and fentanyl. I have contacted orthopedics, Dr. Mariel Gomez. He has responded and has requested procedural sedation for a manual closed hip reduction. Reduction was successful procedural sedation provided by me. Transient hypoxia with O2 sat to 79 was managed with brief bag-valve-mask ventilation. Postreduction films show reduction and anatomical positioning of the prosthesis. The patient was observed until awake and alert and able to ambulate without assistance.     PROCEDURES:    Procedural sedation  Date/Time: 2/27/2020 8:33 PM  Performed by: Jovon Lopez MD  Authorized by: Jovon Lopez MD     Consent:     Consent obtained:  Verbal    Consent given by:  Patient  Indications:     Procedure performed:  Dislocation reduction    Procedure necessitating sedation performed by:  Different physician    Intended level of sedation:  Moderate (conscious sedation)  Pre-sedation assessment:     NPO status caution: unable to specify NPO status ASA classification: class 2 - patient with mild systemic disease      Neck mobility: normal      Mouth opening:  3 or more finger widths    Mallampati score:  I - soft palate, uvula, fauces, pillars visible    Pre-sedation assessments completed and reviewed: airway patency, cardiovascular function, mental status, pain level and respiratory function    Immediate pre-procedure details:     Reviewed: vital signs and relevant labs/tests      Verified: bag valve mask available, emergency equipment available, intubation equipment available, IV patency confirmed, oxygen available, reversal medications available and suction available    Procedure details (see MAR for exact dosages):     Preoxygenation:  Nasal cannula    Sedation:  Propofol    Analgesia:  Fentanyl    Intra-procedure monitoring:  Blood pressure monitoring, cardiac monitor, continuous pulse oximetry, frequent vital sign checks and frequent LOC assessments    Intra-procedure events: hypoxia      Intra-procedure management:  BVM ventilation    Sedation end time:  2/27/2020 8:30 PM  Post-procedure details:     Post-sedation assessment completed:  2/27/2020 8:36 PM    Attendance: Constant attendance by certified staff until patient recovered      Estimated blood loss (see I/O flowsheets): no      Post-sedation assessments completed and reviewed: airway patency, cardiovascular function, mental status and respiratory function      Specimens recovered:  None    Patient is stable for discharge or admission: yes      Patient tolerance: Tolerated well, no immediate complications      FINAL IMPRESSION      1.  Dislocation of internal right hip prosthesis, initial encounter [facility])          DISPOSITION/PLAN   DISPOSITION Decision To Discharge 02/27/2020 09:52:49 PM    PATIENT REFERRED TO:  [address]  [address] [facility]  [phone]    Schedule an appointment as soon as possible for a visit in 1 week  [facility]  [phone]    Schedule an appointment as soon as possible for a visit   As needed    [facility], APRN - CNP  [address], [facility]  [address] [address] [facility]    Schedule an appointment as soon as possible for a visit   As needed    [address] [place] [address] [address] [address] EMERGENCY DEPT  [address]    Return If Symptoms Worsen    Dr. Isela Chandler M.D 2/27/20 9:55 PM            Isela Chandler MD  02/27/20 4798

## 2020-02-28 NOTE — ED NOTES
Assisted patient in walking a lap around the unit with a cane. He tolerated well and states no pain.       Bonifacio Phipps RN  02/27/20 7570

## 2020-03-02 ENCOUNTER — TELEPHONE (OUTPATIENT)
Dept: FAMILY MEDICINE CLINIC | Age: 75
End: 2020-03-02

## 2020-03-02 NOTE — LETTER
March 2, 2020    [address] [id number]    Dear Re-vinyl,    This letter is regarding your Emergency Department (ED) visit at [facility] on 2/27/20. Dr.Brendon Torres wanted to make sure that you understand your discharge instructions and that you were able to fill any prescriptions that may have been ordered for you. Please contact the office at the above phone number if the ED advised you to follow up with Cayetano Jennings, or if you have any further questions or needs. Also did you know -   *Visiting the ED for a non-emergency could result in higher co-pays than you would normally be subject to paying? [facility] practices can often offer you an appointment on the same day that you call for acute issues. *We have some [address] [facility] offices that offer Walk-in appointments; check our website for availability in your community, [email or web address].      *Evisits are now available for patients for through [address] If you do not have MyChart and are interested, please contact the office and a staff member may assist you or go to [email or web address].     Sincerely,   AXEL Méndez CNP and your [facility]

## 2020-03-21 ENCOUNTER — APPOINTMENT (OUTPATIENT)
Dept: GENERAL RADIOLOGY | Age: 75
End: 2020-03-21
Payer: MEDICARE

## 2020-03-21 ENCOUNTER — HOSPITAL ENCOUNTER (EMERGENCY)
Age: 75
Discharge: HOME | End: 2020-03-21
Attending: EMERGENCY MEDICINE
Payer: MEDICARE

## 2020-03-21 VITALS
WEIGHT: 160 LBS | SYSTOLIC BLOOD PRESSURE: 170 MMHG | RESPIRATION RATE: 13 BRPM | BODY MASS INDEX: 24.25 KG/M2 | DIASTOLIC BLOOD PRESSURE: 97 MMHG | HEIGHT: 68 IN | HEART RATE: 62 BPM | TEMPERATURE: 98.5 F | OXYGEN SATURATION: 97 %

## 2020-03-21 PROCEDURE — 27256 TREAT HIP DISLOCATION: CPT

## 2020-03-21 PROCEDURE — 73502 X-RAY EXAM HIP UNI 2-3 VIEWS: CPT

## 2020-03-21 PROCEDURE — 6360000002 HC RX W HCPCS: Performed by: EMERGENCY MEDICINE

## 2020-03-21 PROCEDURE — 27250 TREAT HIP DISLOCATION: CPT

## 2020-03-21 PROCEDURE — 99152 MOD SED SAME PHYS/QHP 5/>YRS: CPT

## 2020-03-21 PROCEDURE — 99285 EMERGENCY DEPT VISIT HI MDM: CPT

## 2020-03-21 PROCEDURE — 99153 MOD SED SAME PHYS/QHP EA: CPT

## 2020-03-21 PROCEDURE — 94760 N-INVAS EAR/PLS OXIMETRY 1: CPT

## 2020-03-21 PROCEDURE — 2700000000 HC OXYGEN THERAPY PER DAY

## 2020-03-21 PROCEDURE — 27265 TREAT HIP DISLOCATION: CPT

## 2020-03-21 PROCEDURE — 2500000003 HC RX 250 WO HCPCS: Performed by: EMERGENCY MEDICINE

## 2020-03-21 PROCEDURE — 94770 HC ETCO2 MONITOR DAILY: CPT

## 2020-03-21 RX ORDER — LORAZEPAM 2 MG/ML
1 INJECTION INTRAMUSCULAR ONCE
Status: DISCONTINUED | OUTPATIENT
Start: 2020-03-21 | End: 2020-03-21

## 2020-03-21 RX ORDER — KETAMINE HCL IN NACL, ISO-OSM 100MG/10ML
140 SYRINGE (ML) INJECTION ONCE
Status: COMPLETED | OUTPATIENT
Start: 2020-03-21 | End: 2020-03-21

## 2020-03-21 RX ORDER — MORPHINE SULFATE 2 MG/ML
2 INJECTION, SOLUTION INTRAMUSCULAR; INTRAVENOUS ONCE
Status: DISCONTINUED | OUTPATIENT
Start: 2020-03-21 | End: 2020-03-21

## 2020-03-21 RX ORDER — PROPOFOL 10 MG/ML
1.5 INJECTION, EMULSION INTRAVENOUS ONCE
Status: COMPLETED | OUTPATIENT
Start: 2020-03-21 | End: 2020-03-21

## 2020-03-21 RX ADMIN — PROPOFOL 45 MG: 10 INJECTION, EMULSION INTRAVENOUS at 16:30

## 2020-03-21 RX ADMIN — Medication 50 MG: at 16:37

## 2020-03-21 ASSESSMENT — ENCOUNTER SYMPTOMS
VOMITING: 0
EYE REDNESS: 0
COLOR CHANGE: 0
SORE THROAT: 0
CHEST TIGHTNESS: 0
BACK PAIN: 0
ABDOMINAL PAIN: 0
NAUSEA: 0
SINUS PRESSURE: 0
PHOTOPHOBIA: 0
COUGH: 0

## 2020-03-21 NOTE — ED NOTES
Pt resting in bed with call light within reach. Assessment unchanged from previous.       Austin Lu RN  03/21/20 4277

## 2020-03-21 NOTE — ED TRIAGE NOTES
Patient arrived to room 22 with c/o hip dislocation. Patient stated he had this happened recently before. Patient stated he bent down to get something out a box and felt it pop out.

## 2020-03-21 NOTE — ED PROVIDER NOTES
Ortho Injury  Date/Time: 3/21/2020 4:41 PM  Performed by: ALLISON Beltran  Authorized by: Alex Barry DO   Consent: Verbal consent obtained. Consent given by: patient  Patient understanding: patient states understanding of the procedure being performed  Patient consent: the patient's understanding of the procedure matches consent given  Procedure consent: procedure consent matches procedure scheduled  Relevant documents: relevant documents present and verified  Test results: test results available and properly labeled  Site marked: the operative site was marked  Imaging studies: imaging studies available  Required items: required blood products, implants, devices, and special equipment available  Patient identity confirmed: verbally with patient  Time out: Immediately prior to procedure a \"time out\" was called to verify the correct patient, procedure, equipment, support staff and site/side marked as required. Injury location: hip  Location details: right hip  Injury type: dislocation  Dislocation type: posterior  Spontaneous dislocation: yes  Prosthesis: yes  Pre-procedure neurovascular assessment: neurovascularly intact  Pre-procedure distal perfusion: normal  Pre-procedure neurological function: normal  Pre-procedure range of motion: reduced    Anesthesia:  Local anesthesia used: no    Sedation:  Patient sedated: yes  Sedatives: propofol and ketamine  Vitals: Vital signs were monitored during sedation.     Post-procedure neurovascular assessment: post-procedure neurovascularly intact  Post-procedure distal perfusion: normal  Post-procedure neurological function: normal  Post-procedure range of motion: normal  Patient tolerance: Patient tolerated the procedure well with no immediate complications              ALLISON Beltran  03/21/20 1732

## 2020-03-21 NOTE — ED NOTES
Pt is alert and talking at this time. Patient is on room air.       Richmond Balderas RN  03/21/20 [address] JUANCHO Liu  03/21/20 9365

## 2020-03-21 NOTE — ED NOTES
Patient 20G IV in the left [facility] was removed at time of discharge, no complication, dressing was placed.       Samantha Mir RN  03/21/20 [id number]

## 2020-03-21 NOTE — ED PROVIDER NOTES
Inability: None    Transportation needs     Medical: None     Non-medical: None   Tobacco Use    Smoking status: Never Smoker    Smokeless tobacco: Never Used   Substance and Sexual Activity    Alcohol use: Yes     Comment: SOCIAL    Drug use: None    Sexual activity: None   Lifestyle    Physical activity     Days per week: None     Minutes per session: None    Stress: None   Relationships    Social connections     Talks on phone: None     Gets together: None     Attends Hoahaoism service: None     Active member of club or organization: None     Attends meetings of clubs or organizations: None     Relationship status: None    Intimate partner violence     Fear of current or ex partner: None     Emotionally abused: None     Physically abused: None     Forced sexual activity: None   Other Topics Concern    None   Social History Narrative    None       REVIEW OF SYSTEMS     Review of Systems   Constitutional: Negative for activity change, chills and fever. HENT: Negative for congestion, sinus pressure and sore throat. Eyes: Negative for photophobia, redness and visual disturbance. Respiratory: Negative for cough and chest tightness. Cardiovascular: Negative for chest pain, palpitations and leg swelling. Gastrointestinal: Negative for abdominal pain, nausea and vomiting. Endocrine: Negative for polydipsia and polyuria. Genitourinary: Negative for decreased urine volume, difficulty urinating, dysuria, flank pain, frequency and urgency. Musculoskeletal: Negative for back pain, myalgias and neck pain. Skin: Negative for color change and rash. Neurological: Negative for weakness, numbness and headaches. Hematological: Negative for adenopathy. Does not bruise/bleed easily. Psychiatric/Behavioral: Negative for confusion and self-injury. Except as noted above the remainder of the review of systems was reviewed and is.    PHYSICAL EXAM    (up to 7 for level 4, 8 or more for level 5) within normal range or not returned as of this dictation. Please note, any cultures that may have been sent were not resulted at the time of this patient visit. EMERGENCY DEPARTMENT COURSE andMedical Decision Making:     MDM/   68 yo male with right hip superior lateral dislocation he is neurovascularly intact. Resting comfortably no acute distress. He has had no complications with sedation or anesthesia prior. Reduction without difficulty. Post hip films show anatomic alignment. No fractures. Patient ambulating tolerating ambulation without difficulty. Patient neurovascularly intact. reviewed with orthopedics patient to follow-up as an outpatient. WBAT- knee immobilizer to her avoid over rotation and sleep with abduction pillow. Patient states he would like to follow-up locally with [facility]. Strict returnprecautions and follow up instructions were discussed with the patient with which the patient agrees    ED Medications administered this visit:    Medications   ketamine (KETALAR) injection 140 mg (has no administration in time range)   propofol injection 109 mg (has no administration in time range)         Procedural sedation  Date/Time: 3/21/2020 5:26 PM  Performed by: Donald Jacobsen DO  Authorized by: Donald Jacobsen DO     Consent:     Consent obtained:  Written    Consent given by:  Patient    Risks discussed:   Allergic reaction, prolonged hypoxia resulting in organ damage, prolonged sedation necessitating reversal, inadequate sedation, nausea, respiratory compromise necessitating ventilatory assistance and intubation, vomiting and dysrhythmia    Alternatives discussed:  Analgesia without sedation  Indications:     Procedure performed:  Dislocation reduction    Procedure necessitating sedation performed by:  Different physician    Intended level of sedation:  Moderate (conscious sedation)  Pre-sedation assessment:     Time since last food or drink:  11a    ASA classification: class 2 - patient with mild systemic disease      Neck mobility: normal      Mouth openin finger widths    Thyromental distance:  3 finger widths    Mallampati score:  III - soft palate, base of uvula visible    Pre-sedation assessments completed and reviewed: airway patency, cardiovascular function, hydration status, mental status, nausea/vomiting and respiratory function      History of difficult intubation: no      Pre-sedation assessment completed:  3/21/2020 4:25 PM  Immediate pre-procedure details:     Reassessment: Patient reassessed immediately prior to procedure      Reviewed: vital signs      Verified: bag valve mask available, emergency equipment available, intubation equipment available, IV patency confirmed, oxygen available and suction available    Procedure details (see MAR for exact dosages):     Sedation start time:  3/21/2020 4:25 PM    Preoxygenation:  Nasal cannula    Sedation:  Propofol (ketamine)    Analgesia:  None    Intra-procedure monitoring:  Blood pressure monitoring, cardiac monitor, continuous capnometry, continuous pulse oximetry, frequent LOC assessments and frequent vital sign checks    Intra-procedure events: none      Sedation end time:  3/21/2020 4:48 PM    Total sedation time (minutes):  23  Post-procedure details:     Post-sedation assessment completed:  3/21/2020 5:10 PM    Attendance: Constant attendance by certified staff until patient recovered      Recovery: Patient returned to pre-procedure baseline      Estimated blood loss (see I/O flowsheets): no      Complications:  None    Post-sedation assessments completed and reviewed: airway patency, cardiovascular function, mental status, pain level and respiratory function      Specimens recovered:  None    Patient is stable for discharge or admission: yes      Patient tolerance: Tolerated well, no immediate complications    : (None if blank)       CLINICAL       1.  Dislocation of right hip, initial encounter [facility])          DISPOSITION/PLAN DISPOSITION    home    PATIENT REFERRED TO:  Spencer Valenzuela [address]  [facility] [address] [facility] [phone]  In 2 days  call to follow-up with orthopedics in 2 days.     DO [address]  [address] [id number]    Call in 2 days      Alton Hernandez , [address]  [address] [place] [address]  [phone]    Schedule an appointment as soon as possible for a visit in 2 days        DISCHARGE MEDICATIONS:  New Prescriptions    No medications on file              (Please note that portions of this note were completed with a voice recognition program.  Efforts were made to edit the dictations but occasionallywords are mis-transcribed.)      Mir Malone DO,(electronically signed)  Attending Physician, Emergency Department        Mir Malone DO  03/21/20 [address] Khalil 013,   03/21/20 2607

## 2020-03-23 ENCOUNTER — CARE COORDINATION (OUTPATIENT)
Dept: CARE COORDINATION | Age: 75
End: 2020-03-23

## 2020-03-25 ENCOUNTER — TELEPHONE (OUTPATIENT)
Dept: FAMILY MEDICINE CLINIC | Age: 75
End: 2020-03-25

## 2020-03-25 NOTE — TELEPHONE ENCOUNTER
Naomi Berry from [facility] called requesting a copy of the patient's EKG done on 11/20/19 to be faxed to [phone] ATTN: Naomi Berry. He is there doing pre op testing for hip replacement and they do not want to repeat a EKG.

## 2020-04-04 ENCOUNTER — HOSPITAL ENCOUNTER (EMERGENCY)
Age: 75
Discharge: HOME | End: 2020-04-04
Payer: MEDICARE

## 2020-04-04 ENCOUNTER — APPOINTMENT (OUTPATIENT)
Dept: GENERAL RADIOLOGY | Age: 75
End: 2020-04-04
Payer: MEDICARE

## 2020-04-04 VITALS
BODY MASS INDEX: 24.55 KG/M2 | HEIGHT: 68 IN | TEMPERATURE: 99.1 F | RESPIRATION RATE: 22 BRPM | HEART RATE: 97 BPM | WEIGHT: 162 LBS | SYSTOLIC BLOOD PRESSURE: 123 MMHG | OXYGEN SATURATION: 100 % | DIASTOLIC BLOOD PRESSURE: 78 MMHG

## 2020-04-04 LAB
ALBUMIN SERPL-MCNC: 3.8 G/DL (ref 3.5–5.1)
ALP BLD-CCNC: 89 U/L (ref 38–126)
ALT SERPL-CCNC: 16 U/L (ref 11–66)
ANION GAP SERPL CALCULATED.3IONS-SCNC: 13 MEQ/L (ref 8–16)
AST SERPL-CCNC: 16 U/L (ref 5–40)
BASOPHILS # BLD: 0.4 %
BASOPHILS ABSOLUTE: 0 THOU/MM3 (ref 0–0.1)
BILIRUB SERPL-MCNC: 0.5 MG/DL (ref 0.3–1.2)
BILIRUBIN DIRECT: < 0.2 MG/DL (ref 0–0.3)
BUN BLDV-MCNC: 14 MG/DL (ref 7–22)
C-REACTIVE PROTEIN: 0.67 MG/DL (ref 0–1)
CALCIUM SERPL-MCNC: 8.8 MG/DL (ref 8.5–10.5)
CHLORIDE BLD-SCNC: 100 MEQ/L (ref 98–111)
CO2: 24 MEQ/L (ref 23–33)
CREAT SERPL-MCNC: 0.8 MG/DL (ref 0.4–1.2)
EOSINOPHIL # BLD: 4.9 %
EOSINOPHILS ABSOLUTE: 0.4 THOU/MM3 (ref 0–0.4)
ERYTHROCYTE [DISTWIDTH] IN BLOOD BY AUTOMATED COUNT: 13.8 % (ref 11.5–14.5)
ERYTHROCYTE [DISTWIDTH] IN BLOOD BY AUTOMATED COUNT: 42.2 FL (ref 35–45)
GFR SERPL CREATININE-BSD FRML MDRD: > 90 ML/MIN/1.73M2
GLUCOSE BLD-MCNC: 98 MG/DL (ref 70–108)
HCT VFR BLD CALC: 36.7 % (ref 42–52)
HEMOGLOBIN: 12 GM/DL (ref 14–18)
IMMATURE GRANS (ABS): 0.03 THOU/MM3 (ref 0–0.07)
IMMATURE GRANULOCYTES: 0.4 %
LYMPHOCYTES # BLD: 6.8 %
LYMPHOCYTES ABSOLUTE: 0.6 THOU/MM3 (ref 1–4.8)
MCH RBC QN AUTO: 28.2 PG (ref 26–33)
MCHC RBC AUTO-ENTMCNC: 32.7 GM/DL (ref 32.2–35.5)
MCV RBC AUTO: 86.4 FL (ref 80–94)
MONOCYTES # BLD: 6.9 %
MONOCYTES ABSOLUTE: 0.6 THOU/MM3 (ref 0.4–1.3)
NUCLEATED RED BLOOD CELLS: 0 /100 WBC
OSMOLALITY CALCULATION: 274.3 MOSMOL/KG (ref 275–300)
PLATELET # BLD: 198 THOU/MM3 (ref 130–400)
PMV BLD AUTO: 9.4 FL (ref 9.4–12.4)
POTASSIUM REFLEX MAGNESIUM: 4.1 MEQ/L (ref 3.5–5.2)
PROCALCITONIN: 0.05 NG/ML (ref 0.01–0.09)
RBC # BLD: 4.25 MILL/MM3 (ref 4.7–6.1)
SEG NEUTROPHILS: 80.6 %
SEGMENTED NEUTROPHILS ABSOLUTE COUNT: 6.9 THOU/MM3 (ref 1.8–7.7)
SODIUM BLD-SCNC: 137 MEQ/L (ref 135–145)
TOTAL PROTEIN: 6.7 G/DL (ref 6.1–8)
WBC # BLD: 8.6 THOU/MM3 (ref 4.8–10.8)

## 2020-04-04 PROCEDURE — 80048 BASIC METABOLIC PNL TOTAL CA: CPT

## 2020-04-04 PROCEDURE — 73502 X-RAY EXAM HIP UNI 2-3 VIEWS: CPT

## 2020-04-04 PROCEDURE — 96360 HYDRATION IV INFUSION INIT: CPT

## 2020-04-04 PROCEDURE — 84145 PROCALCITONIN (PCT): CPT

## 2020-04-04 PROCEDURE — 99283 EMERGENCY DEPT VISIT LOW MDM: CPT

## 2020-04-04 PROCEDURE — 80076 HEPATIC FUNCTION PANEL: CPT

## 2020-04-04 PROCEDURE — 86140 C-REACTIVE PROTEIN: CPT

## 2020-04-04 PROCEDURE — 85025 COMPLETE CBC W/AUTO DIFF WBC: CPT

## 2020-04-04 PROCEDURE — 2580000003 HC RX 258: Performed by: NURSE PRACTITIONER

## 2020-04-04 PROCEDURE — 36415 COLL VENOUS BLD VENIPUNCTURE: CPT

## 2020-04-04 RX ORDER — 0.9 % SODIUM CHLORIDE 0.9 %
1000 INTRAVENOUS SOLUTION INTRAVENOUS ONCE
Status: COMPLETED | OUTPATIENT
Start: 2020-04-04 | End: 2020-04-04

## 2020-04-04 RX ORDER — CLINDAMYCIN HYDROCHLORIDE 300 MG/1
300 CAPSULE ORAL 2 TIMES DAILY
Qty: 14 CAPSULE | Refills: 0 | Status: SHIPPED | OUTPATIENT
Start: 2020-04-04 | End: 2020-04-11

## 2020-04-04 RX ADMIN — SODIUM CHLORIDE 1000 ML: 9 INJECTION, SOLUTION INTRAVENOUS at 19:38

## 2020-04-04 ASSESSMENT — ENCOUNTER SYMPTOMS
CONSTIPATION: 0
WHEEZING: 0
CHEST TIGHTNESS: 0
BACK PAIN: 0
SORE THROAT: 0
ABDOMINAL PAIN: 0
PHOTOPHOBIA: 0
SHORTNESS OF BREATH: 0
RHINORRHEA: 0
COUGH: 0
COLOR CHANGE: 0
NAUSEA: 0
TROUBLE SWALLOWING: 0
SINUS PRESSURE: 0
SINUS PAIN: 0
APNEA: 0
DIARRHEA: 0
ABDOMINAL DISTENTION: 0
FACIAL SWELLING: 0
VOMITING: 0

## 2020-04-04 ASSESSMENT — PAIN SCALES - GENERAL: PAINLEVEL_OUTOF10: 4

## 2020-04-04 ASSESSMENT — PAIN DESCRIPTION - ORIENTATION: ORIENTATION: RIGHT

## 2020-04-04 ASSESSMENT — PAIN DESCRIPTION - PAIN TYPE: TYPE: ACUTE PAIN

## 2020-04-04 NOTE — ED TRIAGE NOTES
Pt presents to the ED via intake with c/o a post-op problem from a recent hip revision. Pt had a revision from a hip replacement 4 months ago. Within the past two weeks pt has had hip dislocations and increased pain. Wound vac was placed on pts surgical incision 3 days ago. Pt reports decreased drainage the past day or two from wound vac. Dressing on wound vac is clean dry and intact. Pt reports that today at 16:00 he had increased pain and chills. Pt rates his pain at a 4/10 after taking tylenol at home.

## 2020-04-04 NOTE — ED PROVIDER NOTES
Malignant neoplasm of prostate ([facility] [address] Osteoarthritis, and Osteoarthritis. SURGICAL HISTORY      has a past surgical history that includes joint replacement (2004, 2008); Prostate biopsy (); and other surgical history (2015). CURRENT MEDICATIONS       Discharge Medication List as of 2020  8:41 PM          ALLERGIES     has No Known Allergies. FAMILY HISTORY     He indicated that his mother is . He indicated that his father is . He indicated that only one of his two sisters is alive. family history includes Cancer in his sister and sister; Heart Disease in his father. SOCIAL HISTORY      reports that he has never smoked. He has never used smokeless tobacco. He reports current alcohol use. PHYSICAL EXAM     INITIAL VITALS:  height is 5' 8\" (1.727 m) and weight is 162 lb (73.5 kg). His oral temperature is 99.1 °F (37.3 °C). His blood pressure is 123/78 and his pulse is 97. His respiration is 22 and oxygen saturation is 100%. Physical Exam  Vitals signs and nursing note reviewed. Constitutional:       General: He is awake. He is not in acute distress. Appearance: Normal appearance. He is well-developed and normal weight. He is not ill-appearing, toxic-appearing or diaphoretic. HENT:      Head: Normocephalic and atraumatic. Nose: Nose normal.      Mouth/Throat:      Mouth: Mucous membranes are moist.      Pharynx: Oropharynx is clear. Eyes:      Extraocular Movements: Extraocular movements intact. Pupils: Pupils are equal, round, and reactive to light. Neck:      Musculoskeletal: Normal range of motion and neck supple. No neck rigidity or muscular tenderness. Cardiovascular:      Rate and Rhythm: Normal rate and regular rhythm. No extrasystoles are present. Pulses: Normal pulses. Heart sounds: Normal heart sounds, S1 normal and S2 normal. Heart sounds not distant. No murmur. No friction rub. No gallop.     Pulmonary:      Effort:

## 2020-04-06 ENCOUNTER — CARE COORDINATION (OUTPATIENT)
Dept: CARE COORDINATION | Age: 75
End: 2020-04-06

## 2020-04-06 ENCOUNTER — TELEPHONE (OUTPATIENT)
Dept: FAMILY MEDICINE CLINIC | Age: 75
End: 2020-04-06

## 2020-04-06 NOTE — LETTER
April 6, 2020    [address] [facility] [address] [facility]    Dear Derick Velasco,    This letter is regarding your Emergency Department (ED) visit at [address] [place] [address] on 4/4/20. Dr.Mandy GARY Buckley wanted to make sure that you understand your discharge instructions and that you were able to fill any prescriptions that may have been ordered for you. Please contact the office at the above phone number if the ED advised you to follow up with Dr.Mandy Anne Abdalla, or if you have any further questions or needs. Also did you know -   *Visiting the ED for a non-emergency could result in higher co-pays than you would normally be subject to paying? [facility]) practices can often offer you an appointment on the same day that you call for acute issues. *We have some [facility] offices that offer Walk-in appointments; check our website for availability in your community, [email or web address].      *Evisits are now available for patients for through [address] If you do not have MyChart and are interested, please contact the office and a staff member may assist you or go to [email or web address].     Sincerely,   Rose Gutiérrez MD and your [facility]

## 2020-04-13 ENCOUNTER — OFFICE VISIT (OUTPATIENT)
Dept: PRIMARY CARE CLINIC | Age: 75
End: 2020-04-13
Payer: MEDICARE

## 2020-04-13 ENCOUNTER — HOSPITAL ENCOUNTER (OUTPATIENT)
Age: 75
Setting detail: SPECIMEN
Discharge: HOME | End: 2020-04-13
Payer: MEDICARE

## 2020-04-13 ENCOUNTER — TELEPHONE (OUTPATIENT)
Dept: FAMILY MEDICINE CLINIC | Age: 75
End: 2020-04-13

## 2020-04-13 VITALS
DIASTOLIC BLOOD PRESSURE: 82 MMHG | RESPIRATION RATE: 14 BRPM | WEIGHT: 162 LBS | OXYGEN SATURATION: 98 % | SYSTOLIC BLOOD PRESSURE: 118 MMHG | HEART RATE: 75 BPM | BODY MASS INDEX: 24.63 KG/M2

## 2020-04-13 LAB
BASOPHILS # BLD: 0.8 %
BASOPHILS ABSOLUTE: 0.1 THOU/MM3 (ref 0–0.1)
CREAT SERPL-MCNC: 0.7 MG/DL (ref 0.4–1.2)
EOSINOPHIL # BLD: 13.8 %
EOSINOPHILS ABSOLUTE: 0.9 THOU/MM3 (ref 0–0.4)
ERYTHROCYTE [DISTWIDTH] IN BLOOD BY AUTOMATED COUNT: 13.6 % (ref 11.5–14.5)
ERYTHROCYTE [DISTWIDTH] IN BLOOD BY AUTOMATED COUNT: 44.7 FL (ref 35–45)
GFR SERPL CREATININE-BSD FRML MDRD: > 90 ML/MIN/1.73M2
HCT VFR BLD CALC: 37.1 % (ref 42–52)
HEMOGLOBIN: 11.6 GM/DL (ref 14–18)
IMMATURE GRANS (ABS): 0.1 THOU/MM3 (ref 0–0.07)
IMMATURE GRANULOCYTES: 1.6 %
LYMPHOCYTES # BLD: 11 %
LYMPHOCYTES ABSOLUTE: 0.7 THOU/MM3 (ref 1–4.8)
MCH RBC QN AUTO: 28.2 PG (ref 26–33)
MCHC RBC AUTO-ENTMCNC: 31.3 GM/DL (ref 32.2–35.5)
MCV RBC AUTO: 90 FL (ref 80–94)
MONOCYTES # BLD: 9.2 %
MONOCYTES ABSOLUTE: 0.6 THOU/MM3 (ref 0.4–1.3)
NUCLEATED RED BLOOD CELLS: 0 /100 WBC
PLATELET # BLD: 302 THOU/MM3 (ref 130–400)
PMV BLD AUTO: 9.4 FL (ref 9.4–12.4)
RBC # BLD: 4.12 MILL/MM3 (ref 4.7–6.1)
SEDIMENTATION RATE, ERYTHROCYTE: 25 MM/HR (ref 0–10)
SEG NEUTROPHILS: 63.6 %
SEGMENTED NEUTROPHILS ABSOLUTE COUNT: 4 THOU/MM3 (ref 1.8–7.7)
WBC # BLD: 6.3 THOU/MM3 (ref 4.8–10.8)

## 2020-04-13 PROCEDURE — 3017F COLORECTAL CA SCREEN DOC REV: CPT | Performed by: FAMILY MEDICINE

## 2020-04-13 PROCEDURE — G8427 DOCREV CUR MEDS BY ELIG CLIN: HCPCS | Performed by: FAMILY MEDICINE

## 2020-04-13 PROCEDURE — G8420 CALC BMI NORM PARAMETERS: HCPCS | Performed by: FAMILY MEDICINE

## 2020-04-13 PROCEDURE — 4040F PNEUMOC VAC/ADMIN/RCVD: CPT | Performed by: FAMILY MEDICINE

## 2020-04-13 PROCEDURE — 1036F TOBACCO NON-USER: CPT | Performed by: FAMILY MEDICINE

## 2020-04-13 PROCEDURE — 99213 OFFICE O/P EST LOW 20 MIN: CPT | Performed by: FAMILY MEDICINE

## 2020-04-13 PROCEDURE — 82565 ASSAY OF CREATININE: CPT

## 2020-04-13 PROCEDURE — 1123F ACP DISCUSS/DSCN MKR DOCD: CPT | Performed by: FAMILY MEDICINE

## 2020-04-13 PROCEDURE — 85025 COMPLETE CBC W/AUTO DIFF WBC: CPT

## 2020-04-13 PROCEDURE — 85651 RBC SED RATE NONAUTOMATED: CPT

## 2020-04-13 RX ORDER — VANCOMYCIN HYDROCHLORIDE 10 G/100ML
INJECTION, POWDER, LYOPHILIZED, FOR SOLUTION INTRAVENOUS
COMMUNITY
Start: 2020-04-10 | End: 2020-04-27 | Stop reason: ALTCHOICE

## 2020-04-13 RX ORDER — MELOXICAM 15 MG/1
15 TABLET ORAL DAILY
COMMUNITY
End: 2021-06-21 | Stop reason: ALTCHOICE

## 2020-04-13 RX ORDER — CEFEPIME HYDROCHLORIDE 2 G/1
INJECTION, POWDER, FOR SOLUTION INTRAVENOUS
COMMUNITY
Start: 2020-04-10 | End: 2020-11-10

## 2020-04-13 ASSESSMENT — PATIENT HEALTH QUESTIONNAIRE - PHQ9
1. LITTLE INTEREST OR PLEASURE IN DOING THINGS: 0
SUM OF ALL RESPONSES TO PHQ QUESTIONS 1-9: 0
2. FEELING DOWN, DEPRESSED OR HOPELESS: 0
SUM OF ALL RESPONSES TO PHQ QUESTIONS 1-9: 0
SUM OF ALL RESPONSES TO PHQ9 QUESTIONS 1 & 2: 0

## 2020-04-13 ASSESSMENT — ENCOUNTER SYMPTOMS
SHORTNESS OF BREATH: 0
NAUSEA: 0
COLOR CHANGE: 0
WHEEZING: 0
COUGH: 0
CONSTIPATION: 0
ABDOMINAL PAIN: 0
DIARRHEA: 0

## 2020-04-13 NOTE — TELEPHONE ENCOUNTER
[address] to get pts most recent info regarding his admission pt was discharged on 4-10-20 they need a request faxed on the offices letter/ fax cover sheet requesting it first and will then get it faxed for the pts upcoming apt today. Please use letter head with office information on it.     Candice Gomez [phone]  Fax: [phone]

## 2020-04-13 NOTE — PROGRESS NOTES
and wheezing. Cardiovascular: Negative for chest pain and palpitations. Gastrointestinal: Negative for abdominal pain, constipation, diarrhea and nausea. Musculoskeletal: Positive for gait problem (mild, post op). Negative for arthralgias and myalgias. Skin: Positive for wound. Negative for color change and pallor. Neurological: Negative for dizziness and headaches. Objective:     /82 (Site: Left Upper Arm, Position: Sitting, Cuff Size: Medium Adult)   Pulse 75   Resp 14   Wt 162 lb (73.5 kg)   SpO2 98%   BMI 24.63 kg/m²     Physical Exam  Vitals signs and nursing note reviewed. Constitutional:       Appearance: He is well-developed. HENT:      Head: Normocephalic and atraumatic. Eyes:      General: No scleral icterus. Right eye: No discharge. Left eye: No discharge. Conjunctiva/sclera: Conjunctivae normal.   Neck:      Musculoskeletal: Normal range of motion. Cardiovascular:      Rate and Rhythm: Normal rate and regular rhythm. Heart sounds: Normal heart sounds. Pulmonary:      Effort: Pulmonary effort is normal.      Breath sounds: Normal breath sounds. No wheezing. Abdominal:      General: Bowel sounds are normal. There is no distension. Palpations: Abdomen is soft. Tenderness: There is no abdominal tenderness. Musculoskeletal:         General: No tenderness. Lymphadenopathy:      Cervical: No cervical adenopathy. Skin:     General: Skin is warm and dry. Neurological:      Mental Status: He is alert and oriented to person, place, and time. Coordination: Coordination normal.   Psychiatric:         Behavior: Behavior normal.         Thought Content: Thought content normal.         Judgment: Judgment normal.           Assessment:       Diagnosis Orders   1. Drainage from wound     2. Encounter for postoperative wound check         Plan:   Reassurance given.   Cont abx, follow up with surgeon on wed  Change dressing as

## 2020-04-14 ENCOUNTER — HOSPITAL ENCOUNTER (OUTPATIENT)
Age: 75
Setting detail: SPECIMEN
Discharge: HOME | End: 2020-04-14
Payer: MEDICARE

## 2020-04-14 LAB — VANCOMYCIN TROUGH: 22.9 UG/ML (ref 5–15)

## 2020-04-14 PROCEDURE — 80202 ASSAY OF VANCOMYCIN: CPT

## 2020-04-16 ENCOUNTER — TELEPHONE (OUTPATIENT)
Dept: WOUND CARE | Age: 75
End: 2020-04-16

## 2020-04-16 NOTE — TELEPHONE ENCOUNTER
Patients wife called regarding possible need for Hyperbaric oxygen therapy. Instructed wife that she would need a referral sent to our office for evaluation. She voices understanding.

## 2020-04-20 ENCOUNTER — HOSPITAL ENCOUNTER (OUTPATIENT)
Age: 75
Setting detail: SPECIMEN
Discharge: HOME | End: 2020-04-20
Payer: MEDICARE

## 2020-04-20 LAB
BASOPHILS # BLD: 0.3 %
BASOPHILS ABSOLUTE: 0 THOU/MM3 (ref 0–0.1)
CREAT SERPL-MCNC: 0.7 MG/DL (ref 0.4–1.2)
EOSINOPHIL # BLD: 3.5 %
EOSINOPHILS ABSOLUTE: 0.4 THOU/MM3 (ref 0–0.4)
ERYTHROCYTE [DISTWIDTH] IN BLOOD BY AUTOMATED COUNT: 14.2 % (ref 11.5–14.5)
ERYTHROCYTE [DISTWIDTH] IN BLOOD BY AUTOMATED COUNT: 45.3 FL (ref 35–45)
GFR SERPL CREATININE-BSD FRML MDRD: > 90 ML/MIN/1.73M2
HCT VFR BLD CALC: 38 % (ref 42–52)
HEMOGLOBIN: 11.7 GM/DL (ref 14–18)
IMMATURE GRANS (ABS): 0.04 THOU/MM3 (ref 0–0.07)
IMMATURE GRANULOCYTES: 0.3 %
LYMPHOCYTES # BLD: 4.5 %
LYMPHOCYTES ABSOLUTE: 0.5 THOU/MM3 (ref 1–4.8)
MCH RBC QN AUTO: 27.6 PG (ref 26–33)
MCHC RBC AUTO-ENTMCNC: 30.8 GM/DL (ref 32.2–35.5)
MCV RBC AUTO: 89.6 FL (ref 80–94)
MONOCYTES # BLD: 6.7 %
MONOCYTES ABSOLUTE: 0.8 THOU/MM3 (ref 0.4–1.3)
NUCLEATED RED BLOOD CELLS: 0 /100 WBC
PLATELET # BLD: 208 THOU/MM3 (ref 130–400)
PMV BLD AUTO: 9.6 FL (ref 9.4–12.4)
RBC # BLD: 4.24 MILL/MM3 (ref 4.7–6.1)
SEDIMENTATION RATE, ERYTHROCYTE: 22 MM/HR (ref 0–10)
SEG NEUTROPHILS: 84.7 %
SEGMENTED NEUTROPHILS ABSOLUTE COUNT: 10.1 THOU/MM3 (ref 1.8–7.7)
VANCOMYCIN TROUGH: 21.5 UG/ML (ref 5–15)
WBC # BLD: 11.9 THOU/MM3 (ref 4.8–10.8)

## 2020-04-20 PROCEDURE — 85651 RBC SED RATE NONAUTOMATED: CPT

## 2020-04-20 PROCEDURE — 80202 ASSAY OF VANCOMYCIN: CPT

## 2020-04-20 PROCEDURE — 85025 COMPLETE CBC W/AUTO DIFF WBC: CPT

## 2020-04-20 PROCEDURE — 82565 ASSAY OF CREATININE: CPT

## 2020-04-27 ENCOUNTER — OFFICE VISIT (OUTPATIENT)
Dept: PRIMARY CARE CLINIC | Age: 75
End: 2020-04-27
Payer: MEDICARE

## 2020-04-27 ENCOUNTER — NURSE TRIAGE (OUTPATIENT)
Dept: OTHER | Facility: CLINIC | Age: 75
End: 2020-04-27

## 2020-04-27 PROCEDURE — G8427 DOCREV CUR MEDS BY ELIG CLIN: HCPCS | Performed by: NURSE PRACTITIONER

## 2020-04-27 PROCEDURE — 99214 OFFICE O/P EST MOD 30 MIN: CPT | Performed by: NURSE PRACTITIONER

## 2020-04-27 PROCEDURE — 1123F ACP DISCUSS/DSCN MKR DOCD: CPT | Performed by: NURSE PRACTITIONER

## 2020-04-27 PROCEDURE — 1036F TOBACCO NON-USER: CPT | Performed by: NURSE PRACTITIONER

## 2020-04-27 PROCEDURE — 4040F PNEUMOC VAC/ADMIN/RCVD: CPT | Performed by: NURSE PRACTITIONER

## 2020-04-27 PROCEDURE — 1111F DSCHRG MED/CURRENT MED MERGE: CPT | Performed by: NURSE PRACTITIONER

## 2020-04-27 PROCEDURE — 3017F COLORECTAL CA SCREEN DOC REV: CPT | Performed by: NURSE PRACTITIONER

## 2020-04-27 PROCEDURE — G8420 CALC BMI NORM PARAMETERS: HCPCS | Performed by: NURSE PRACTITIONER

## 2020-04-27 RX ORDER — LISINOPRIL 10 MG/1
10 TABLET ORAL DAILY
Qty: 90 TABLET | Refills: 1 | Status: SHIPPED | OUTPATIENT
Start: 2020-04-27 | End: 2020-12-14 | Stop reason: SDUPTHER

## 2020-04-27 RX ORDER — VANCOMYCIN HYDROCHLORIDE 125 MG/1
125 CAPSULE ORAL 4 TIMES DAILY
COMMUNITY
End: 2020-09-22 | Stop reason: ALTCHOICE

## 2020-04-27 ASSESSMENT — ENCOUNTER SYMPTOMS
SHORTNESS OF BREATH: 0
BLURRED VISION: 0

## 2020-04-27 NOTE — PROGRESS NOTES
continue with h/h and antibiotics previously prescribed.          Medical Decision Making: moderate complexity

## 2020-04-28 ENCOUNTER — HOSPITAL ENCOUNTER (OUTPATIENT)
Age: 75
Setting detail: SPECIMEN
Discharge: HOME | End: 2020-04-28
Payer: MEDICARE

## 2020-04-28 LAB
BASOPHILS # BLD: 1.2 %
BASOPHILS ABSOLUTE: 0.1 THOU/MM3 (ref 0–0.1)
CREAT SERPL-MCNC: 0.8 MG/DL (ref 0.4–1.2)
EOSINOPHIL # BLD: 10.8 %
EOSINOPHILS ABSOLUTE: 0.6 THOU/MM3 (ref 0–0.4)
ERYTHROCYTE [DISTWIDTH] IN BLOOD BY AUTOMATED COUNT: 13.8 % (ref 11.5–14.5)
ERYTHROCYTE [DISTWIDTH] IN BLOOD BY AUTOMATED COUNT: 43.8 FL (ref 35–45)
GFR SERPL CREATININE-BSD FRML MDRD: > 90 ML/MIN/1.73M2
HCT VFR BLD CALC: 33.8 % (ref 42–52)
HEMOGLOBIN: 10.6 GM/DL (ref 14–18)
IMMATURE GRANS (ABS): 0.12 THOU/MM3 (ref 0–0.07)
IMMATURE GRANULOCYTES: 2.1 %
LYMPHOCYTES # BLD: 11.8 %
LYMPHOCYTES ABSOLUTE: 0.7 THOU/MM3 (ref 1–4.8)
MCH RBC QN AUTO: 27.5 PG (ref 26–33)
MCHC RBC AUTO-ENTMCNC: 31.4 GM/DL (ref 32.2–35.5)
MCV RBC AUTO: 87.8 FL (ref 80–94)
MONOCYTES # BLD: 14.6 %
MONOCYTES ABSOLUTE: 0.8 THOU/MM3 (ref 0.4–1.3)
NUCLEATED RED BLOOD CELLS: 0 /100 WBC
PLATELET # BLD: 256 THOU/MM3 (ref 130–400)
PMV BLD AUTO: 9.6 FL (ref 9.4–12.4)
RBC # BLD: 3.85 MILL/MM3 (ref 4.7–6.1)
SEDIMENTATION RATE, ERYTHROCYTE: 19 MM/HR (ref 0–10)
SEG NEUTROPHILS: 59.5 %
SEGMENTED NEUTROPHILS ABSOLUTE COUNT: 3.5 THOU/MM3 (ref 1.8–7.7)
VANCOMYCIN TROUGH: 20.6 UG/ML (ref 5–15)
WBC # BLD: 5.8 THOU/MM3 (ref 4.8–10.8)

## 2020-04-28 PROCEDURE — 85651 RBC SED RATE NONAUTOMATED: CPT

## 2020-04-28 PROCEDURE — 82565 ASSAY OF CREATININE: CPT

## 2020-04-28 PROCEDURE — 80202 ASSAY OF VANCOMYCIN: CPT

## 2020-04-28 PROCEDURE — 85025 COMPLETE CBC W/AUTO DIFF WBC: CPT

## 2020-04-29 ENCOUNTER — OFFICE VISIT (OUTPATIENT)
Dept: PRIMARY CARE CLINIC | Age: 75
End: 2020-04-29
Payer: MEDICARE

## 2020-04-29 VITALS
OXYGEN SATURATION: 97 % | SYSTOLIC BLOOD PRESSURE: 124 MMHG | DIASTOLIC BLOOD PRESSURE: 80 MMHG | RESPIRATION RATE: 16 BRPM | HEART RATE: 69 BPM

## 2020-04-29 LAB
ANION GAP: 8 MEQ/L (ref 8–16)
BASOPHILS # BLD: 0.5 % (ref 0–3)
BUN BLDV-MCNC: 13 MG/DL (ref 7–18)
CHLORIDE BLD-SCNC: 102 MEQ/L (ref 98–107)
CO2: 28 MEQ/L (ref 21–32)
CREAT SERPL-MCNC: 0.9 MG/DL (ref 0.6–1.3)
EOSINOPHILS RELATIVE PERCENT: 10 % (ref 0–4)
GFR, ESTIMATED: 88 ML/MIN/1.73M2
GLUCOSE BLD-MCNC: 99 MG/DL (ref 74–106)
HCT VFR BLD CALC: 32.2 % (ref 42–52)
HEMOGLOBIN: 10.8 GM/DL (ref 14–18)
LYMPHOCYTES # BLD: 12.6 % (ref 15–47)
MCH RBC QN AUTO: 28.1 PG (ref 27–31)
MCHC RBC AUTO-ENTMCNC: 33.5 GM/DL (ref 33–37)
MCV RBC AUTO: 83.9 FL (ref 80–94)
MONOCYTES: 12.5 % (ref 0–12)
PDW BLD-RTO: 14.6 % (ref 11.5–14.5)
PLATELET # BLD: 261 THOU/MM3 (ref 130–400)
PMV BLD AUTO: 7 FL (ref 7.4–10.4)
POC CALCIUM: 8.5 MG/DL (ref 8.5–10.1)
POTASSIUM SERPL-SCNC: 4.6 MEQ/L (ref 3.5–5.1)
RBC # BLD: 3.84 MILL/MM3 (ref 4.7–6.1)
SEGS: 64.4 % (ref 43–75)
SODIUM BLD-SCNC: 138 MEQ/L (ref 136–145)
WBC # BLD: 6.7 THOU/MM3 (ref 4.8–10.8)

## 2020-04-29 PROCEDURE — 3017F COLORECTAL CA SCREEN DOC REV: CPT | Performed by: NURSE PRACTITIONER

## 2020-04-29 PROCEDURE — G8420 CALC BMI NORM PARAMETERS: HCPCS | Performed by: NURSE PRACTITIONER

## 2020-04-29 PROCEDURE — 4040F PNEUMOC VAC/ADMIN/RCVD: CPT | Performed by: NURSE PRACTITIONER

## 2020-04-29 PROCEDURE — 1123F ACP DISCUSS/DSCN MKR DOCD: CPT | Performed by: NURSE PRACTITIONER

## 2020-04-29 PROCEDURE — 99214 OFFICE O/P EST MOD 30 MIN: CPT | Performed by: NURSE PRACTITIONER

## 2020-04-29 PROCEDURE — 1036F TOBACCO NON-USER: CPT | Performed by: NURSE PRACTITIONER

## 2020-04-29 PROCEDURE — G8427 DOCREV CUR MEDS BY ELIG CLIN: HCPCS | Performed by: NURSE PRACTITIONER

## 2020-04-29 ASSESSMENT — ENCOUNTER SYMPTOMS
COLOR CHANGE: 1
NAUSEA: 0
VOMITING: 0
ABDOMINAL PAIN: 0
COUGH: 0
SHORTNESS OF BREATH: 0

## 2020-04-29 NOTE — PROGRESS NOTES
2020     Reinaldo Giles (:  1945) is a 69 y.o. male, here for evaluation of the following medical concerns:    Patient is here today with his wife, they both have concerns about his right hip incision site, there is discoloration and some swelling at the bottom. Denies any fever or chills. Visiting nurses came yesterday, put a bandage on it. Taking blood pressure medication, doing well with that. Walked 1 mile yesterday and did yard work over the last two days, so he is unsure if he over did it. Review of Systems   Constitutional: Negative for chills, fatigue and fever. Respiratory: Negative for cough and shortness of breath. Cardiovascular: Negative for chest pain and leg swelling. Gastrointestinal: Negative for abdominal pain, nausea and vomiting. Genitourinary: Negative for difficulty urinating, dysuria and urgency. Musculoskeletal: Negative for arthralgias, gait problem, joint swelling and neck pain. Tenderness at incision site towards the bottom   Skin: Positive for color change and wound. Hip surgical incision site     Neurological: Negative for dizziness, weakness, light-headedness and numbness. Prior to Visit Medications    Medication Sig Taking?  Authorizing Provider   vancomycin (VANCOCIN) 125 MG capsule Take 125 mg by mouth 4 times daily Taking for a total of two weeks, has 5 more days Yes Historical Provider, MD   lisinopril (PRINIVIL;ZESTRIL) 10 MG tablet Take 1 tablet by mouth daily Yes Tank Aguero, APRN - CNP   ceFEPIme (MAXIPIME) 2 g injection  Yes Historical Provider, MD   aspirin 81 MG tablet Take 81 mg by mouth 2 times daily Yes Historical Provider, MD   Multiple Vitamin (ONE-A-DAY MENS PO) Take by mouth Yes Historical Provider, MD   meloxicam (MOBIC) 15 MG tablet Take 15 mg by mouth daily Yes Historical Provider, MD        Social History     Tobacco Use    Smoking status: Never Smoker    Smokeless tobacco: Never Used

## 2020-04-30 VITALS
OXYGEN SATURATION: 94 % | HEART RATE: 65 BPM | DIASTOLIC BLOOD PRESSURE: 80 MMHG | RESPIRATION RATE: 12 BRPM | SYSTOLIC BLOOD PRESSURE: 170 MMHG

## 2020-04-30 ASSESSMENT — ENCOUNTER SYMPTOMS
DIARRHEA: 0
ABDOMINAL PAIN: 0
BACK PAIN: 0
VOMITING: 0
NAUSEA: 0

## 2020-05-04 ENCOUNTER — HOSPITAL ENCOUNTER (OUTPATIENT)
Age: 75
Setting detail: SPECIMEN
Discharge: HOME | End: 2020-05-04
Payer: MEDICARE

## 2020-05-04 LAB
BASOPHILS # BLD: 0.3 % (ref 0–3)
CREAT SERPL-MCNC: 0.7 MG/DL (ref 0.4–1.2)
EOSINOPHILS RELATIVE PERCENT: 6.8 % (ref 0–4)
GFR SERPL CREATININE-BSD FRML MDRD: > 90 ML/MIN/1.73M2
HCT VFR BLD CALC: 34.7 % (ref 42–52)
HEMOGLOBIN: 11.2 GM/DL (ref 14–18)
LYMPHOCYTES # BLD: 9.4 % (ref 15–47)
MCH RBC QN AUTO: 26.9 PG (ref 27–31)
MCHC RBC AUTO-ENTMCNC: 32.4 GM/DL (ref 33–37)
MCV RBC AUTO: 83.1 FL (ref 80–94)
MONOCYTES: 7.2 % (ref 0–12)
PDW BLD-RTO: 14.4 % (ref 11.5–14.5)
PLATELET # BLD: 242 THOU/MM3 (ref 130–400)
PMV BLD AUTO: 7.6 FL (ref 7.4–10.4)
RBC # BLD: 4.17 MILL/MM3 (ref 4.7–6.1)
SEDIMENTATION RATE, ERYTHROCYTE: 26 MM/HR (ref 0–10)
SEGS: 76.3 % (ref 43–75)
VANCOMYCIN TROUGH: 23.8 UG/ML (ref 5–15)
WBC # BLD: 6.2 THOU/MM3 (ref 4.8–10.8)

## 2020-05-04 PROCEDURE — 82565 ASSAY OF CREATININE: CPT

## 2020-05-04 PROCEDURE — 85651 RBC SED RATE NONAUTOMATED: CPT

## 2020-05-04 PROCEDURE — 80202 ASSAY OF VANCOMYCIN: CPT

## 2020-05-04 PROCEDURE — 85025 COMPLETE CBC W/AUTO DIFF WBC: CPT

## 2020-05-04 PROCEDURE — 36415 COLL VENOUS BLD VENIPUNCTURE: CPT

## 2020-05-18 ENCOUNTER — HOSPITAL ENCOUNTER (OUTPATIENT)
Age: 75
Setting detail: SPECIMEN
Discharge: HOME | End: 2020-05-18
Payer: MEDICARE

## 2020-05-18 LAB
BASOPHILS # BLD: 0.7 %
BASOPHILS ABSOLUTE: 0 THOU/MM3 (ref 0–0.1)
CREAT SERPL-MCNC: 0.9 MG/DL (ref 0.4–1.2)
EOSINOPHIL # BLD: 7 %
EOSINOPHILS ABSOLUTE: 0.4 THOU/MM3 (ref 0–0.4)
ERYTHROCYTE [DISTWIDTH] IN BLOOD BY AUTOMATED COUNT: 14.5 % (ref 11.5–14.5)
ERYTHROCYTE [DISTWIDTH] IN BLOOD BY AUTOMATED COUNT: 47.2 FL (ref 35–45)
GFR SERPL CREATININE-BSD FRML MDRD: 83 ML/MIN/1.73M2
HCT VFR BLD CALC: 40.2 % (ref 42–52)
HEMOGLOBIN: 12 GM/DL (ref 14–18)
IMMATURE GRANS (ABS): 0.02 THOU/MM3 (ref 0–0.07)
IMMATURE GRANULOCYTES: 0.3 %
LYMPHOCYTES # BLD: 11.2 %
LYMPHOCYTES ABSOLUTE: 0.7 THOU/MM3 (ref 1–4.8)
MCH RBC QN AUTO: 26.7 PG (ref 26–33)
MCHC RBC AUTO-ENTMCNC: 29.9 GM/DL (ref 32.2–35.5)
MCV RBC AUTO: 89.5 FL (ref 80–94)
MONOCYTES # BLD: 12.6 %
MONOCYTES ABSOLUTE: 0.8 THOU/MM3 (ref 0.4–1.3)
NUCLEATED RED BLOOD CELLS: 0 /100 WBC
PLATELET # BLD: 233 THOU/MM3 (ref 130–400)
PMV BLD AUTO: 10.1 FL (ref 9.4–12.4)
RBC # BLD: 4.49 MILL/MM3 (ref 4.7–6.1)
SEDIMENTATION RATE, ERYTHROCYTE: 20 MM/HR (ref 0–10)
SEG NEUTROPHILS: 68.2 %
SEGMENTED NEUTROPHILS ABSOLUTE COUNT: 4.1 THOU/MM3 (ref 1.8–7.7)
WBC # BLD: 6 THOU/MM3 (ref 4.8–10.8)

## 2020-05-18 PROCEDURE — 80202 ASSAY OF VANCOMYCIN: CPT

## 2020-05-18 PROCEDURE — 82565 ASSAY OF CREATININE: CPT

## 2020-05-18 PROCEDURE — 85025 COMPLETE CBC W/AUTO DIFF WBC: CPT

## 2020-05-18 PROCEDURE — 36415 COLL VENOUS BLD VENIPUNCTURE: CPT

## 2020-05-18 PROCEDURE — 85651 RBC SED RATE NONAUTOMATED: CPT

## 2020-05-19 LAB — VANCOMYCIN TROUGH: 21.7 UG/ML (ref 5–15)

## 2020-05-26 ENCOUNTER — OFFICE VISIT (OUTPATIENT)
Dept: FAMILY MEDICINE CLINIC | Age: 75
End: 2020-05-26
Payer: MEDICARE

## 2020-05-26 VITALS
HEART RATE: 76 BPM | SYSTOLIC BLOOD PRESSURE: 124 MMHG | RESPIRATION RATE: 16 BRPM | HEIGHT: 68 IN | BODY MASS INDEX: 24.64 KG/M2 | OXYGEN SATURATION: 98 % | DIASTOLIC BLOOD PRESSURE: 76 MMHG | TEMPERATURE: 98 F

## 2020-05-26 PROCEDURE — 1036F TOBACCO NON-USER: CPT | Performed by: NURSE PRACTITIONER

## 2020-05-26 PROCEDURE — 3017F COLORECTAL CA SCREEN DOC REV: CPT | Performed by: NURSE PRACTITIONER

## 2020-05-26 PROCEDURE — 1123F ACP DISCUSS/DSCN MKR DOCD: CPT | Performed by: NURSE PRACTITIONER

## 2020-05-26 PROCEDURE — G8427 DOCREV CUR MEDS BY ELIG CLIN: HCPCS | Performed by: NURSE PRACTITIONER

## 2020-05-26 PROCEDURE — 99214 OFFICE O/P EST MOD 30 MIN: CPT | Performed by: NURSE PRACTITIONER

## 2020-05-26 PROCEDURE — 4040F PNEUMOC VAC/ADMIN/RCVD: CPT | Performed by: NURSE PRACTITIONER

## 2020-05-26 PROCEDURE — G8420 CALC BMI NORM PARAMETERS: HCPCS | Performed by: NURSE PRACTITIONER

## 2020-05-26 SDOH — ECONOMIC STABILITY: FOOD INSECURITY: WITHIN THE PAST 12 MONTHS, THE FOOD YOU BOUGHT JUST DIDN'T LAST AND YOU DIDN'T HAVE MONEY TO GET MORE.: NEVER TRUE

## 2020-05-26 SDOH — ECONOMIC STABILITY: TRANSPORTATION INSECURITY
IN THE PAST 12 MONTHS, HAS THE LACK OF TRANSPORTATION KEPT YOU FROM MEDICAL APPOINTMENTS OR FROM GETTING MEDICATIONS?: NO

## 2020-05-26 SDOH — ECONOMIC STABILITY: TRANSPORTATION INSECURITY
IN THE PAST 12 MONTHS, HAS LACK OF TRANSPORTATION KEPT YOU FROM MEETINGS, WORK, OR FROM GETTING THINGS NEEDED FOR DAILY LIVING?: NO

## 2020-05-26 SDOH — ECONOMIC STABILITY: FOOD INSECURITY: WITHIN THE PAST 12 MONTHS, YOU WORRIED THAT YOUR FOOD WOULD RUN OUT BEFORE YOU GOT MONEY TO BUY MORE.: NEVER TRUE

## 2020-05-26 SDOH — ECONOMIC STABILITY: INCOME INSECURITY: HOW HARD IS IT FOR YOU TO PAY FOR THE VERY BASICS LIKE FOOD, HOUSING, MEDICAL CARE, AND HEATING?: NOT HARD AT ALL

## 2020-05-26 ASSESSMENT — ENCOUNTER SYMPTOMS
GASTROINTESTINAL NEGATIVE: 1
EYES NEGATIVE: 1
RESPIRATORY NEGATIVE: 1

## 2020-05-26 NOTE — PROGRESS NOTES
place, and time. Deep Tendon Reflexes: Reflexes are normal and symmetric. Assessment:      Diagnosis Orders   1. Arthritis, hip     2. Essential hypertension         Plan:      Return in about 4 months (around 9/26/2020). No orders of the defined types were placed in this encounter. No orders of the defined types were placed in this encounter. Cont meds  Ok to lower asa to 1 day    Patient given educational materials - seepatient instructions. Discussed use, benefit, and side effects of prescribed medications. All patient questions answered. Pt voiced understanding. Patient agreed withtreatment plan. Follow up as directed.      Electronically signed by AXEL John CNP on 5/26/2020 at 12:56 PM

## 2020-06-17 ENCOUNTER — HOSPITAL ENCOUNTER (OUTPATIENT)
Age: 75
Discharge: HOME | End: 2020-06-17
Payer: MEDICARE

## 2020-06-17 LAB
ALBUMIN SERPL-MCNC: 3.7 G/DL (ref 3.5–5.1)
ALP BLD-CCNC: 98 U/L (ref 38–126)
ALT SERPL-CCNC: 15 U/L (ref 11–66)
ANION GAP SERPL CALCULATED.3IONS-SCNC: 11 MEQ/L (ref 8–16)
AST SERPL-CCNC: 20 U/L (ref 5–40)
BILIRUB SERPL-MCNC: 0.3 MG/DL (ref 0.3–1.2)
BUN BLDV-MCNC: 16 MG/DL (ref 7–22)
CALCIUM SERPL-MCNC: 8.5 MG/DL (ref 8.5–10.5)
CHLORIDE BLD-SCNC: 102 MEQ/L (ref 98–111)
CO2: 27 MEQ/L (ref 23–33)
CREAT SERPL-MCNC: 1 MG/DL (ref 0.4–1.2)
GFR SERPL CREATININE-BSD FRML MDRD: 73 ML/MIN/1.73M2
GLUCOSE BLD-MCNC: 98 MG/DL (ref 70–108)
POTASSIUM SERPL-SCNC: 4.6 MEQ/L (ref 3.5–5.2)
PROSTATE SPECIFIC ANTIGEN: < 0.02 NG/ML (ref 0–1)
SODIUM BLD-SCNC: 140 MEQ/L (ref 135–145)
TOTAL PROTEIN: 7.1 G/DL (ref 6.1–8)

## 2020-06-17 PROCEDURE — 84153 ASSAY OF PSA TOTAL: CPT

## 2020-06-17 PROCEDURE — 36415 COLL VENOUS BLD VENIPUNCTURE: CPT

## 2020-06-17 PROCEDURE — 84403 ASSAY OF TOTAL TESTOSTERONE: CPT

## 2020-06-17 PROCEDURE — 80053 COMPREHEN METABOLIC PANEL: CPT

## 2020-06-21 LAB — TESTOSTERONE TOTAL: 500 NG/DL (ref 300–720)

## 2020-09-22 ENCOUNTER — OFFICE VISIT (OUTPATIENT)
Dept: FAMILY MEDICINE CLINIC | Age: 75
End: 2020-09-22
Payer: MEDICARE

## 2020-09-22 VITALS
BODY MASS INDEX: 25.19 KG/M2 | OXYGEN SATURATION: 98 % | RESPIRATION RATE: 14 BRPM | SYSTOLIC BLOOD PRESSURE: 118 MMHG | TEMPERATURE: 97.8 F | HEIGHT: 68 IN | HEART RATE: 83 BPM | DIASTOLIC BLOOD PRESSURE: 62 MMHG | WEIGHT: 166.2 LBS

## 2020-09-22 PROCEDURE — 1036F TOBACCO NON-USER: CPT | Performed by: NURSE PRACTITIONER

## 2020-09-22 PROCEDURE — 3017F COLORECTAL CA SCREEN DOC REV: CPT | Performed by: NURSE PRACTITIONER

## 2020-09-22 PROCEDURE — 1123F ACP DISCUSS/DSCN MKR DOCD: CPT | Performed by: NURSE PRACTITIONER

## 2020-09-22 PROCEDURE — 4040F PNEUMOC VAC/ADMIN/RCVD: CPT | Performed by: NURSE PRACTITIONER

## 2020-09-22 PROCEDURE — 99214 OFFICE O/P EST MOD 30 MIN: CPT | Performed by: NURSE PRACTITIONER

## 2020-09-22 PROCEDURE — G8417 CALC BMI ABV UP PARAM F/U: HCPCS | Performed by: NURSE PRACTITIONER

## 2020-09-22 PROCEDURE — G8427 DOCREV CUR MEDS BY ELIG CLIN: HCPCS | Performed by: NURSE PRACTITIONER

## 2020-09-22 RX ORDER — DOXYCYCLINE HYCLATE 100 MG
100 TABLET ORAL 2 TIMES DAILY
Qty: 20 TABLET | Refills: 0 | Status: SHIPPED | OUTPATIENT
Start: 2020-09-22 | End: 2020-09-22 | Stop reason: SDUPTHER

## 2020-09-22 RX ORDER — DOXYCYCLINE HYCLATE 100 MG
100 TABLET ORAL 2 TIMES DAILY
Qty: 20 TABLET | Refills: 0 | Status: SHIPPED | OUTPATIENT
Start: 2020-09-22 | End: 2020-10-02

## 2020-09-22 ASSESSMENT — ENCOUNTER SYMPTOMS
EYES NEGATIVE: 1
GASTROINTESTINAL NEGATIVE: 1
RESPIRATORY NEGATIVE: 1
COLOR CHANGE: 1

## 2020-09-22 NOTE — PROGRESS NOTES
Monalisa Owen is a 69 y.o. male whopresents today for :  Chief Complaint   Patient presents with    Follow-up    Hip Pain     pt feels like hip is getting worse not better past few weeks.  Mass     lower center back. Been there for several years but started bleeding the other day and tender to touch    Fatigue       HPI:     HPI  Pt here for fu.  bp is doing very well. He does report he is very frustrated with his hip. Movement causes pain  He lacks the ability to ambulate well  Just does not feel like it is improving. Denies any signs of still having infection in his hip. 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 Recently it became red and tender.   It has begun to drain     Patient Active Problem List   Diagnosis    Pro[facility] ([facility] [address]    Hypertension    BPH (benign prostatic hyperplasia)        Past Medical History:   Diagnosis Date    Hypertension 9/2005    Malignant neoplasm of prostate ([facility] [address]     Osteoarthritis 2004    Osteoarthritis 2004    PRIMARY AFFECTING THE HIPS      Past Surgical History:   Procedure Laterality Date    JOINT REPLACEMENT  11/2004, 12/2008    R hip, L hip    OTHER SURGICAL HISTORY  7/8/2015    XI ROBOTIC LAPAROSCOPIC PROSTATECTOMY, PELVIC LYMPH RUI DISSECTION    PROSTATE BIOPSY  2015     Family History   Problem Relation Age of Onset    Heart Disease Father     Cancer Sister         1 with breast and 1 with (blood)    Cancer Sister      Social History     Tobacco Use    Smoking status: Never Smoker    Smokeless tobacco: Never Used   Substance Use Topics    Alcohol use: Yes     Comment: SOCIAL      Current Outpatient Medications   Medication Sig Dispense Refill    doxycycline hyclate (VIBRA-TABS) 100 MG tablet Take 1 tablet by mouth 2 times daily for 10 days 20 tablet 0    lisinopril (PRINIVIL;ZESTRIL) 10 MG tablet Take 1 tablet by mouth daily 90 tablet 1    aspirin 81 MG tablet Take 81 mg by mouth 2 times daily      Multiple Vitamin (ONE-A-DAY MENS PO) Take by mouth      ceFEPIme (MAXIPIME) 2 g injection       meloxicam (MOBIC) 15 MG tablet Take 15 mg by mouth daily       No current facility-administered medications for this visit. No Known Allergies  Health Maintenance   Topic Date Due    Hepatitis C screen  1945    DTaP/Tdap/Td vaccine (1 - Tdap) 12/16/1966    Shingles Vaccine (1 of 2) 12/16/1997    Colon cancer screen colonoscopy  12/16/1997    Pneumococcal 65+ years Vaccine (2 of 2 - PPSV23) 05/16/2019    Annual Wellness Visit (AWV)  06/21/2019    Flu vaccine (1) 09/01/2020    Lipid screen  05/27/2021    Potassium monitoring  06/17/2021    Creatinine monitoring  06/17/2021    PSA counseling  06/17/2021    Hepatitis A vaccine  Aged Out    Hepatitis B vaccine  Aged Out    Hib vaccine  Aged Out    Meningococcal (ACWY) vaccine  Aged Out       Subjective:     Review of Systems   Constitutional: Positive for fatigue. HENT: Negative. Eyes: Negative. Respiratory: Negative. Cardiovascular: Negative. Gastrointestinal: Negative. Musculoskeletal: Positive for myalgias. Skin: Positive for color change. Neurological: Positive for weakness. Objective:     Vitals:    09/22/20 1508   BP: 118/62   Site: Left Upper Arm   Position: Sitting   Cuff Size: Small Adult   Pulse: 83   Resp: 14   Temp: 97.8 °F (36.6 °C)   TempSrc: Temporal   SpO2: 98%   Weight: 166 lb 3.2 oz (75.4 kg)   Height: 5' 7.99\" (1.727 m)       Physical Exam  Constitutional:       Appearance: He is well-developed. HENT:      Head: Normocephalic. Right Ear: Tympanic membrane and external ear normal.      Left Ear: Tympanic membrane and external ear normal.      Nose: Nose normal.   Neck:      Musculoskeletal: Normal range of motion and neck supple. Cardiovascular:      Rate and Rhythm: Normal rate and regular rhythm. Heart sounds: Normal heart sounds. No murmur. No friction rub. No gallop.     Pulmonary:      Effort: Pulmonary effort is normal.      Breath sounds: Normal breath sounds. No wheezing or rales. Abdominal:      General: Bowel sounds are normal.      Palpations: Abdomen is soft. Tenderness: There is no abdominal tenderness. There is no guarding. Musculoskeletal: Normal range of motion. Legs:    Lymphadenopathy:      Cervical: No cervical adenopathy. Skin:     General: Skin is warm. Neurological:      Mental Status: He is alert and oriented to person, place, and time. Deep Tendon Reflexes: Reflexes are normal and symmetric. Assessment:      Diagnosis Orders   1. Pain of right hip joint  External Referral To Physical Therapy   2. Infected sebaceous cyst  doxycycline hyclate (VIBRA-TABS) 100 MG tablet    DISCONTINUED: doxycycline hyclate (VIBRA-TABS) 100 MG tablet   3. Essential hypertension     4. Prostate cancer [facility])         Plan:      Return in about 4 months (around 1/22/2021). Orders Placed This Encounter   Procedures    External Referral To Physical Therapy     Referral Priority:   Routine     Referral Type:   Eval and Treat     Requested Specialty:   Physical Therapy     Number of Visits Requested:   1     Orders Placed This Encounter   Medications    DISCONTD: doxycycline hyclate (VIBRA-TABS) 100 MG tablet     Sig: Take 1 tablet by mouth 2 times daily for 10 days     Dispense:  20 tablet     Refill:  0    doxycycline hyclate (VIBRA-TABS) 100 MG tablet     Sig: Take 1 tablet by mouth 2 times daily for 10 days     Dispense:  20 tablet     Refill:  0     Originally sent to [facility] in [facility]  [facility]anted [facility] in [place]      For the seb cyst.  Warm compresses,  Start doxy  If does not resolve call to have procedure to remove cyst  For hip. Refer to PT for strengthening      Patient given educational materials - seepatient instructions. Discussed use, benefit, and side effects of prescribed medications. All patient questions answered. Pt voiced understanding.   Patient agreed withtreatment plan. Follow up as directed.      Electronically signed by AXEL Busch CNP on 9/22/2020 at 8:03 PM

## 2020-09-23 NOTE — PROGRESS NOTES
LM on Cahtryn's voicemail at [address] requesting cologuard results.   Ask her to call back to confirm she has this info

## 2020-09-24 ENCOUNTER — TELEPHONE (OUTPATIENT)
Dept: FAMILY MEDICINE CLINIC | Age: 75
End: 2020-09-24

## 2020-11-10 ENCOUNTER — OFFICE VISIT (OUTPATIENT)
Dept: FAMILY MEDICINE CLINIC | Age: 75
End: 2020-11-10
Payer: MEDICARE

## 2020-11-10 VITALS
HEIGHT: 68 IN | DIASTOLIC BLOOD PRESSURE: 76 MMHG | HEART RATE: 73 BPM | RESPIRATION RATE: 12 BRPM | WEIGHT: 160.4 LBS | OXYGEN SATURATION: 98 % | SYSTOLIC BLOOD PRESSURE: 106 MMHG | BODY MASS INDEX: 24.31 KG/M2 | TEMPERATURE: 97.6 F

## 2020-11-10 PROCEDURE — G8484 FLU IMMUNIZE NO ADMIN: HCPCS | Performed by: NURSE PRACTITIONER

## 2020-11-10 PROCEDURE — 1036F TOBACCO NON-USER: CPT | Performed by: NURSE PRACTITIONER

## 2020-11-10 PROCEDURE — 99214 OFFICE O/P EST MOD 30 MIN: CPT | Performed by: NURSE PRACTITIONER

## 2020-11-10 PROCEDURE — 4040F PNEUMOC VAC/ADMIN/RCVD: CPT | Performed by: NURSE PRACTITIONER

## 2020-11-10 PROCEDURE — 3017F COLORECTAL CA SCREEN DOC REV: CPT | Performed by: NURSE PRACTITIONER

## 2020-11-10 PROCEDURE — G8427 DOCREV CUR MEDS BY ELIG CLIN: HCPCS | Performed by: NURSE PRACTITIONER

## 2020-11-10 PROCEDURE — 1123F ACP DISCUSS/DSCN MKR DOCD: CPT | Performed by: NURSE PRACTITIONER

## 2020-11-10 PROCEDURE — G8420 CALC BMI NORM PARAMETERS: HCPCS | Performed by: NURSE PRACTITIONER

## 2020-11-10 PROCEDURE — 36415 COLL VENOUS BLD VENIPUNCTURE: CPT | Performed by: NURSE PRACTITIONER

## 2020-11-10 RX ORDER — PANTOPRAZOLE SODIUM 40 MG/1
40 TABLET, DELAYED RELEASE ORAL
Qty: 30 TABLET | Refills: 0 | Status: SHIPPED | OUTPATIENT
Start: 2020-11-10 | End: 2021-06-21 | Stop reason: ALTCHOICE

## 2020-11-10 ASSESSMENT — ENCOUNTER SYMPTOMS
EYES NEGATIVE: 1
ABDOMINAL PAIN: 1
RESPIRATORY NEGATIVE: 1

## 2020-11-10 NOTE — PROGRESS NOTES
Johan Ventura is a 69 y.o. male whopresents today for :  Chief Complaint   Patient presents with    Abdominal Pain    Weight Loss     10lbs 4-6 weeks        HPI:     HPI  For the past month. Epigastric abd pain. Has had increase in bowel movements. No black bm. Reports stools have been more light. Pt reports appetite is down. Has lost about 10lbs. Eating doesn't make worse but just isnt hungry     Patient Active Problem List   Diagnosis    Pro[facility] cancer ([facility] [address]    Hypertension    BPH (benign prostatic hyperplasia)        Past Medical History:   Diagnosis Date    Hypertension 9/2005    Malignant neoplasm of prostate ([facility] [address]     Osteoarthritis 2004    Osteoarthritis 2004    PRIMARY AFFECTING THE HIPS      Past Surgical History:   Procedure Laterality Date    JOINT REPLACEMENT  11/2004, 12/2008    R hip, L hip    OTHER SURGICAL HISTORY  7/8/2015    XI ROBOTIC LAPAROSCOPIC PROSTATECTOMY, PELVIC LYMPH RUI DISSECTION    PROSTATE BIOPSY  2015     Family History   Problem Relation Age of Onset    Heart Disease Father     Cancer Sister         1 with breast and 1 with (blood)    Cancer Sister      Social History     Tobacco Use    Smoking status: Never Smoker    Smokeless tobacco: Never Used   Substance Use Topics    Alcohol use: Yes     Comment: SOCIAL      Current Outpatient Medications   Medication Sig Dispense Refill    pantoprazole (PROTONIX) 40 MG tablet Take 1 tablet by mouth every morning (before breakfast) 30 tablet 0    lisinopril (PRINIVIL;ZESTRIL) 10 MG tablet Take 1 tablet by mouth daily 90 tablet 1    Multiple Vitamin (ONE-A-DAY MENS PO) Take by mouth      aspirin 81 MG tablet Take 81 mg by mouth 2 times daily      meloxicam (MOBIC) 15 MG tablet Take 15 mg by mouth daily       No current facility-administered medications for this visit.       No Known Allergies  Health Maintenance   Topic Date Due    Hepatitis C screen  1945    DTaP/Tdap/Td vaccine (1 - Tdap) 12/16/1966    Shingles Vaccine (1 of 2) 12/16/1997    Colon cancer screen colonoscopy  12/16/1997    Pneumococcal 65+ years Vaccine (2 of 2 - PPSV23) 05/16/2019    Annual Wellness Visit (AWV)  06/21/2019    Flu vaccine (1) 09/01/2020    Lipid screen  05/27/2021    Potassium monitoring  06/17/2021    Creatinine monitoring  06/17/2021    PSA counseling  06/17/2021    Hepatitis A vaccine  Aged Out    Hepatitis B vaccine  Aged Out    Hib vaccine  Aged Out    Meningococcal (ACWY) vaccine  Aged Out       Subjective:     Review of Systems   Constitutional: Positive for unexpected weight change. HENT: Negative. Eyes: Negative. Respiratory: Negative. Cardiovascular: Negative. Gastrointestinal: Positive for abdominal pain. Musculoskeletal: Negative. Skin: Negative. Neurological: Negative. Objective:     Vitals:    11/10/20 1455   BP: 106/76   Site: Left Upper Arm   Position: Sitting   Cuff Size: Medium Adult   Pulse: 73   Resp: 12   Temp: 97.6 °F (36.4 °C)   TempSrc: Temporal   SpO2: 98%   Weight: 160 lb 6.4 oz (72.8 kg)   Height: 5' 7.99\" (1.727 m)       Physical Exam  Constitutional:       Appearance: He is well-developed. HENT:      Head: Normocephalic. Right Ear: Tympanic membrane and external ear normal.      Left Ear: Tympanic membrane and external ear normal.      Nose: Nose normal.   Neck:      Musculoskeletal: Normal range of motion and neck supple. Cardiovascular:      Rate and Rhythm: Normal rate and regular rhythm. Heart sounds: Normal heart sounds. No murmur. No friction rub. No gallop. Pulmonary:      Effort: Pulmonary effort is normal.      Breath sounds: Normal breath sounds. No wheezing or rales. Abdominal:      General: Bowel sounds are normal.      Palpations: Abdomen is soft. Tenderness: There is abdominal tenderness in the epigastric area. There is no guarding or rebound. Musculoskeletal: Normal range of motion.    Lymphadenopathy:      Cervical: No cervical adenopathy. Skin:     General: Skin is warm. Neurological:      Mental Status: He is alert and oriented to person, place, and time. Deep Tendon Reflexes: Reflexes are normal and symmetric. Assessment:      Diagnosis Orders   1. Epigastric pain  pantoprazole (PROTONIX) 40 MG tablet    CBC Auto Differential    Comprehensive Metabolic Panel    CT ABDOMEN PELVIS W IV CONTRAST Additional Contrast? Oral    Amylase    Lipase    Lipase    Amylase    Comprehensive Metabolic Panel    CBC Auto Differential   2. Weight loss  CBC Auto Differential    Comprehensive Metabolic Panel    CT ABDOMEN PELVIS W IV CONTRAST Additional Contrast? Oral    Amylase    Lipase    Lipase    Amylase    Comprehensive Metabolic Panel    CBC Auto Differential       Plan:      Return in about 3 weeks (around 12/1/2020). Orders Placed This Encounter   Procedures    CT ABDOMEN PELVIS W IV CONTRAST Additional Contrast? Oral     Standing Status:   Future     Standing Expiration Date:   11/10/2021     Order Specific Question:   Additional Contrast?     Answer:   Oral    CBC Auto Differential     Standing Status:   Future     Number of Occurrences:   1     Standing Expiration Date:   11/10/2021    Comprehensive Metabolic Panel     Standing Status:   Future     Number of Occurrences:   1     Standing Expiration Date:   11/10/2021    Amylase     Standing Status:   Future     Number of Occurrences:   1     Standing Expiration Date:   11/10/2021    Lipase     Standing Status:   Future     Number of Occurrences:   1     Standing Expiration Date:   11/10/2021     Orders Placed This Encounter   Medications    pantoprazole (PROTONIX) 40 MG tablet     Sig: Take 1 tablet by mouth every morning (before breakfast)     Dispense:  30 tablet     Refill:  0      His symptoms are concerning to me. I am check ing labs today and ordering ct. Possible liver/pancreatic etiology?   Will start ppi  Fu in 3 weeks  Will notify pt of test results when they are available. If they are not notified they are to call office for the result      Patient given educational materials - seepatient instructions. Discussed use, benefit, and side effects of prescribed medications. All patient questions answered. Pt voiced understanding. Patient agreed withtreatment plan. Follow up as directed.      Electronically signed by AXEL Villarreal CNP on 11/10/2020 at 5:39 PM

## 2020-11-11 LAB
ALBUMIN SERPL-MCNC: 3.5 G/DL (ref 3.5–5.1)
ALP BLD-CCNC: 90 U/L (ref 38–126)
ALT SERPL-CCNC: 16 U/L (ref 11–66)
AMYLASE: 91 U/L (ref 20–104)
ANION GAP SERPL CALCULATED.3IONS-SCNC: 11 MEQ/L (ref 8–16)
AST SERPL-CCNC: 19 U/L (ref 5–40)
BASOPHILS # BLD: 0.4 %
BASOPHILS ABSOLUTE: 0 THOU/MM3 (ref 0–0.1)
BILIRUB SERPL-MCNC: 0.3 MG/DL (ref 0.3–1.2)
BUN BLDV-MCNC: 21 MG/DL (ref 7–22)
CALCIUM SERPL-MCNC: 9.1 MG/DL (ref 8.5–10.5)
CHLORIDE BLD-SCNC: 104 MEQ/L (ref 98–111)
CO2: 24 MEQ/L (ref 23–33)
CREAT SERPL-MCNC: 0.9 MG/DL (ref 0.4–1.2)
EOSINOPHIL # BLD: 2.8 %
EOSINOPHILS ABSOLUTE: 0.2 THOU/MM3 (ref 0–0.4)
ERYTHROCYTE [DISTWIDTH] IN BLOOD BY AUTOMATED COUNT: 14 % (ref 11.5–14.5)
ERYTHROCYTE [DISTWIDTH] IN BLOOD BY AUTOMATED COUNT: 44.4 FL (ref 35–45)
GFR SERPL CREATININE-BSD FRML MDRD: 83 ML/MIN/1.73M2
GLUCOSE BLD-MCNC: 102 MG/DL (ref 70–108)
HCT VFR BLD CALC: 36.6 % (ref 42–52)
HEMOGLOBIN: 11.4 GM/DL (ref 14–18)
IMMATURE GRANS (ABS): 0.02 THOU/MM3 (ref 0–0.07)
IMMATURE GRANULOCYTES: 0.3 %
LIPASE: 57.7 U/L (ref 5.6–51.3)
LYMPHOCYTES # BLD: 11.2 %
LYMPHOCYTES ABSOLUTE: 0.8 THOU/MM3 (ref 1–4.8)
MCH RBC QN AUTO: 27.1 PG (ref 26–33)
MCHC RBC AUTO-ENTMCNC: 31.1 GM/DL (ref 32.2–35.5)
MCV RBC AUTO: 87.1 FL (ref 80–94)
MONOCYTES # BLD: 9.3 %
MONOCYTES ABSOLUTE: 0.6 THOU/MM3 (ref 0.4–1.3)
NUCLEATED RED BLOOD CELLS: 0 /100 WBC
PLATELET # BLD: 239 THOU/MM3 (ref 130–400)
PMV BLD AUTO: 10.1 FL (ref 9.4–12.4)
POTASSIUM SERPL-SCNC: 4.4 MEQ/L (ref 3.5–5.2)
RBC # BLD: 4.2 MILL/MM3 (ref 4.7–6.1)
SEG NEUTROPHILS: 76 %
SEGMENTED NEUTROPHILS ABSOLUTE COUNT: 5.1 THOU/MM3 (ref 1.8–7.7)
SODIUM BLD-SCNC: 139 MEQ/L (ref 135–145)
TOTAL PROTEIN: 6.5 G/DL (ref 6.1–8)
WBC # BLD: 6.7 THOU/MM3 (ref 4.8–10.8)

## 2020-11-17 ENCOUNTER — HOSPITAL ENCOUNTER (OUTPATIENT)
Dept: CT IMAGING | Age: 75
Discharge: HOME | End: 2020-11-17
Payer: MEDICARE

## 2020-11-17 PROCEDURE — 74177 CT ABD & PELVIS W/CONTRAST: CPT

## 2020-11-17 PROCEDURE — 6360000004 HC RX CONTRAST MEDICATION: Performed by: NURSE PRACTITIONER

## 2020-11-17 RX ADMIN — IOHEXOL 50 ML: 240 INJECTION, SOLUTION INTRATHECAL; INTRAVASCULAR; INTRAVENOUS; ORAL at 09:51

## 2020-11-17 RX ADMIN — IOPAMIDOL 85 ML: 755 INJECTION, SOLUTION INTRAVENOUS at 09:51

## 2020-12-14 ENCOUNTER — TELEPHONE (OUTPATIENT)
Dept: FAMILY MEDICINE CLINIC | Age: 75
End: 2020-12-14

## 2020-12-14 RX ORDER — LISINOPRIL 10 MG/1
10 TABLET ORAL DAILY
Qty: 90 TABLET | Refills: 1 | Status: SHIPPED | OUTPATIENT
Start: 2020-12-14 | End: 2021-10-21 | Stop reason: SDUPTHER

## 2020-12-14 NOTE — TELEPHONE ENCOUNTER
ECC received a call from:    Name of Caller: Hilary Michele    Relationship to patient:Self    Reason for call: Patient calling in regards to BP medication prescribed by [facility]. Lisinopril 10 mg tab/Daily. Patient states that the Rx has no refills and he is unsure if he needs to request a refill or if [facility] wants him to discontinue?  Please call the patient [phone]

## 2021-01-04 ENCOUNTER — NURSE ONLY (OUTPATIENT)
Dept: FAMILY MEDICINE CLINIC | Age: 76
End: 2021-01-04
Payer: MEDICARE

## 2021-01-04 ENCOUNTER — NURSE TRIAGE (OUTPATIENT)
Dept: OTHER | Facility: CLINIC | Age: 76
End: 2021-01-04

## 2021-01-04 ENCOUNTER — TELEPHONE (OUTPATIENT)
Dept: FAMILY MEDICINE CLINIC | Age: 76
End: 2021-01-04

## 2021-01-04 DIAGNOSIS — R30.0 DYSURIA: Primary | ICD-10-CM

## 2021-01-04 LAB
BILIRUBIN URINE: NEGATIVE
BLOOD URINE, POC: ABNORMAL
CHARACTER, URINE: ABNORMAL
COLOR, URINE: ABNORMAL
GLUCOSE URINE: NEGATIVE MG/DL
KETONES, URINE: NEGATIVE
LEUKOCYTE CLUMPS, URINE: ABNORMAL
NITRITE, URINE: POSITIVE
PH, URINE: 5.5 (ref 5–9)
PROTEIN, URINE: 100 MG/DL
SPECIFIC GRAVITY, URINE: 1.02 (ref 1–1.03)
UROBILINOGEN, URINE: 0.2 EU/DL (ref 0–1)

## 2021-01-04 PROCEDURE — 99211 OFF/OP EST MAY X REQ PHY/QHP: CPT | Performed by: NURSE PRACTITIONER

## 2021-01-04 PROCEDURE — 81003 URINALYSIS AUTO W/O SCOPE: CPT | Performed by: NURSE PRACTITIONER

## 2021-01-04 RX ORDER — CIPROFLOXACIN 500 MG/1
500 TABLET, FILM COATED ORAL 2 TIMES DAILY
Qty: 28 TABLET | Refills: 0 | Status: SHIPPED | OUTPATIENT
Start: 2021-01-04 | End: 2021-01-18

## 2021-01-04 NOTE — PROGRESS NOTES
Results for POC orders placed in visit on 01/04/21   POCT Urinalysis No Micro (Auto)   Result Value Ref Range    Glucose, Ur Negative NEGATIVE mg/dl    Bilirubin Urine Negative     Ketones, Urine Negative NEGATIVE    Specific Gravity, Urine 1.020 1.002 - 1.030    Blood, UA POC Small (A) NEGATIVE    pH, Urine 5.50 5.0 - 9.0    Protein, Urine 100 (A) NEGATIVE mg/dl    Urobilinogen, Urine 0.20 0.0 - 1.0 eu/dl    Nitrite, Urine Positive (A) NEGATIVE    Leukocyte Clumps, Urine Moderate (A) NEGATIVE    Color, Urine Dark yellow (A) YELLOW-STRAW    Character, Urine Slightly Cloudy [id number]      Diagnosis Orders   1.  Dysuria  POCT Urinalysis No Micro (Auto)    ciprofloxacin (CIPRO) 500 MG tablet    Culture, Urine     See orders  Sent script to [facility]

## 2021-01-04 NOTE — TELEPHONE ENCOUNTER
I discussed symptoms with pt. He has painful urination, chills and weakness for the past 2 weeks. I made him an appointment to check his urine for infection today. Do you want anything else done a this time?
No sounds good
Please call pt and see how he is doing
ago)      Unknown    7. COMMUNITY SPREAD: \"Are there lots of cases of COVID-19 (community spread) where you live? \" (See public health department website, if unsure)        Yes    8. SYMPTOMS: \"Do you have any symptoms? \" (e.g., fever, cough, breathing difficulty, loss of taste or smell)      See travel screen    9. PREGNANCY OR POSTPARTUM: \"Is there any chance you are pregnant? \" \"When was your last menstrual period? \" \"Did you deliver in the last 2 weeks? \"  n/a        10. HIGH RISK: \"Do you have any heart or lung problems? Do you have a weak immune system? \" (e.g., heart failure, COPD, asthma, HIV positive, chemotherapy, renal failure, diabetes mellitus, sickle cell anemia, obesity)        Denies    11. TRAVEL: \"Have you traveled out of the country recently? \" If so, \"When and where? \" Also ask about out-of-state travel, since the [facility] has identified some high-risk cities for community spread in the [address]. Note: Travel becomes less relevant if there is widespread community transmission where the patient lives.         Denies    Protocols used: CORONAVIRUS (COVID-19 ) EXPOSURE-ADULT-[place], CORONAVIRUS (COVID-19)  DIAGNOSED OR SUSPECTED-ADULT-[place]

## 2021-01-04 NOTE — TELEPHONE ENCOUNTER
ECC received a call from:    Name of Caller: Elva Luis    Relationship to patient: Self    Best contact number: [phone]    Reason for call: Return from triage. Pt states he has a UTI. Pt does have some COVID symptoms. Screened red. Pt's wife wants him to be seen in office. No VV for today. Please advise.   [phone]

## 2021-01-06 LAB
ORGANISM: ABNORMAL
URINE CULTURE, ROUTINE: ABNORMAL

## 2021-01-25 ENCOUNTER — OFFICE VISIT (OUTPATIENT)
Dept: FAMILY MEDICINE CLINIC | Age: 76
End: 2021-01-25
Payer: MEDICARE

## 2021-01-25 VITALS
DIASTOLIC BLOOD PRESSURE: 68 MMHG | WEIGHT: 160.2 LBS | RESPIRATION RATE: 20 BRPM | OXYGEN SATURATION: 98 % | BODY MASS INDEX: 24.28 KG/M2 | HEART RATE: 68 BPM | TEMPERATURE: 98.6 F | HEIGHT: 68 IN | SYSTOLIC BLOOD PRESSURE: 122 MMHG

## 2021-01-25 DIAGNOSIS — C61 PROSTATE CANCER (HCC): ICD-10-CM

## 2021-01-25 DIAGNOSIS — Z23 NEED FOR INFLUENZA VACCINATION: ICD-10-CM

## 2021-01-25 DIAGNOSIS — G57.01 PIRIFORMIS SYNDROME OF RIGHT SIDE: ICD-10-CM

## 2021-01-25 DIAGNOSIS — I10 ESSENTIAL HYPERTENSION: Primary | ICD-10-CM

## 2021-01-25 DIAGNOSIS — Z23 NEED FOR PNEUMOCOCCAL VACCINATION: ICD-10-CM

## 2021-01-25 PROCEDURE — 90653 IIV ADJUVANT VACCINE IM: CPT | Performed by: NURSE PRACTITIONER

## 2021-01-25 PROCEDURE — 4040F PNEUMOC VAC/ADMIN/RCVD: CPT | Performed by: NURSE PRACTITIONER

## 2021-01-25 PROCEDURE — G0009 ADMIN PNEUMOCOCCAL VACCINE: HCPCS | Performed by: NURSE PRACTITIONER

## 2021-01-25 PROCEDURE — 1123F ACP DISCUSS/DSCN MKR DOCD: CPT | Performed by: NURSE PRACTITIONER

## 2021-01-25 PROCEDURE — G8482 FLU IMMUNIZE ORDER/ADMIN: HCPCS | Performed by: NURSE PRACTITIONER

## 2021-01-25 PROCEDURE — G8427 DOCREV CUR MEDS BY ELIG CLIN: HCPCS | Performed by: NURSE PRACTITIONER

## 2021-01-25 PROCEDURE — 1036F TOBACCO NON-USER: CPT | Performed by: NURSE PRACTITIONER

## 2021-01-25 PROCEDURE — G0008 ADMIN INFLUENZA VIRUS VAC: HCPCS | Performed by: NURSE PRACTITIONER

## 2021-01-25 PROCEDURE — 90732 PPSV23 VACC 2 YRS+ SUBQ/IM: CPT | Performed by: NURSE PRACTITIONER

## 2021-01-25 PROCEDURE — 3017F COLORECTAL CA SCREEN DOC REV: CPT | Performed by: NURSE PRACTITIONER

## 2021-01-25 PROCEDURE — 99214 OFFICE O/P EST MOD 30 MIN: CPT | Performed by: NURSE PRACTITIONER

## 2021-01-25 PROCEDURE — G8420 CALC BMI NORM PARAMETERS: HCPCS | Performed by: NURSE PRACTITIONER

## 2021-01-25 RX ORDER — MULTIVIT WITH MINERALS/LUTEIN
1000 TABLET ORAL DAILY
COMMUNITY

## 2021-01-25 RX ORDER — CALCIUM CARBONATE/VITAMIN D3 500-10/5ML
1 LIQUID (ML) ORAL DAILY
COMMUNITY

## 2021-01-25 ASSESSMENT — ENCOUNTER SYMPTOMS
GASTROINTESTINAL NEGATIVE: 1
EYES NEGATIVE: 1
RESPIRATORY NEGATIVE: 1

## 2021-01-25 ASSESSMENT — PATIENT HEALTH QUESTIONNAIRE - PHQ9: SUM OF ALL RESPONSES TO PHQ9 QUESTIONS 1 & 2: 0

## 2021-01-25 NOTE — PROGRESS NOTES
Viola Troncoso is a 70 y.o. male who presents today for :  Chief Complaint   Patient presents with    Follow-up    Hip Pain       HPI:     HPI  Pt here for fu. His bp is doing well. Reviewed his hip. Does well some days and some days are very sore.   Reports when he has to go up and down steps seems to be at its worst.  Describes pain more in the gluteal area     Patient Active Problem List   Diagnosis    Pro[facility] cancer ([facility] [address]    Hypertension    BPH (benign prostatic hyperplasia)        Past Medical History:   Diagnosis Date    Hypertension 9/2005    Malignant neoplasm of prostate ([facility] [address]     Osteoarthritis 2004    Osteoarthritis 2004    PRIMARY AFFECTING THE HIPS      Past Surgical History:   Procedure Laterality Date    JOINT REPLACEMENT  11/2004, 12/2008    R hip, L hip    OTHER SURGICAL HISTORY  7/8/2015    XI ROBOTIC LAPAROSCOPIC PROSTATECTOMY, PELVIC LYMPH RUI DISSECTION    PROSTATE BIOPSY  2015     Family History   Problem Relation Age of Onset    Heart Disease Father     Cancer Sister         1 with breast and 1 with (blood)    Cancer Sister      Social History     Tobacco Use    Smoking status: Never Smoker    Smokeless tobacco: Never Used   Substance Use Topics    Alcohol use: Yes     Comment: SOCIAL      Current Outpatient Medications   Medication Sig Dispense Refill    Ascorbic Acid (VITAMIN C) 1000 MG tablet Take 1,000 mg by mouth daily      Cholecalciferol (VITAMIN D3) 125 MCG (5000 UT) CAPS Take 1 capsule by mouth daily      Zinc 30 MG CAPS Take 1 capsule by mouth daily      Probiotic Product (PROBIOTIC DAILY PO) Take 1 tablet by mouth daily      lisinopril (PRINIVIL;ZESTRIL) 10 MG tablet Take 1 tablet by mouth daily 90 tablet 1    Multiple Vitamin (ONE-A-DAY MENS PO) Take by mouth      pantoprazole (PROTONIX) 40 MG tablet Take 1 tablet by mouth every morning (before breakfast) (Patient not taking: Reported on 1/25/2021) 30 tablet 0    meloxicam (MOBIC) 15 MG tablet Take 15 mg by mouth daily       No current facility-administered medications for this visit. No Known Allergies  Health Maintenance   Topic Date Due    Hepatitis C screen  1945    DTaP/Tdap/Td vaccine (1 - Tdap) 12/16/1966    Shingles Vaccine (1 of 2) 12/16/1997    Colon cancer screen colonoscopy  12/16/1997    Annual Wellness Visit (AWV)  12/06/2020    Lipid screen  05/27/2021    PSA counseling  06/17/2021    Potassium monitoring  11/10/2021    Creatinine monitoring  11/10/2021    Flu vaccine  Completed    Pneumococcal 65+ years Vaccine  Completed    Hepatitis A vaccine  Aged Out    Hepatitis B vaccine  Aged Out    Hib vaccine  Aged Out    Meningococcal (ACWY) vaccine  Aged Out       Subjective:     Review of Systems   Constitutional: Negative. HENT: Negative. Eyes: Negative. Respiratory: Negative. Cardiovascular: Negative. Gastrointestinal: Negative. Musculoskeletal: Positive for myalgias. Skin: Negative. Neurological: Negative. Objective:     Vitals:    01/25/21 1510   BP: 122/68   Site: Right Upper Arm   Position: Sitting   Cuff Size: Medium Adult   Pulse: 68   Resp: 20   Temp: 98.6 °F (37 °C)   TempSrc: Temporal   SpO2: 98%   Weight: 160 lb 3.2 oz (72.7 kg)   Height: 5' 7.99\" (1.727 m)       Physical Exam  Constitutional:       Appearance: He is well-developed. HENT:      Head: Normocephalic. Right Ear: Tympanic membrane and external ear normal.      Left Ear: Tympanic membrane and external ear normal.      Nose: Nose normal.   Neck:      Musculoskeletal: Normal range of motion and neck supple. Cardiovascular:      Rate and Rhythm: Normal rate and regular rhythm. Heart sounds: Normal heart sounds. No murmur. No friction rub. No gallop. Pulmonary:      Effort: Pulmonary effort is normal.      Breath sounds: Normal breath sounds. No wheezing or rales. Abdominal:      General: Bowel sounds are normal.      Palpations: Abdomen is soft. Tenderness: There is no abdominal tenderness. There is no guarding. Musculoskeletal: Normal range of motion. Legs:    Lymphadenopathy:      Cervical: No cervical adenopathy. Skin:     General: Skin is warm. Neurological:      Mental Status: He is alert and oriented to person, place, and time. Deep Tendon Reflexes: Reflexes are normal and symmetric. Assessment:      Diagnosis Orders   1. Essential hypertension     2. Piriformis syndrome of right side     3. Need for pneumococcal vaccination  Pneumococcal polysaccharide vaccine 23-valent greater than or equal to 3yo subcutaneous/IM   4. Prostate cancer ([facility] [address]     5. Need for influenza vaccination  INFLUENZA, TRIV, INACTIVATED, SUBUNIT, ADJUVANTED, 65 YRS AND OLDER, IM, PREFILL SYR, 0.5ML (FLUAD TRIV)       Plan:      Return in about 4 months (around 5/25/2021). Orders Placed This Encounter   Procedures    Pneumococcal polysaccharide vaccine 23-valent greater than or equal to 3yo subcutaneous/IM    INFLUENZA, TRIV, INACTIVATED, SUBUNIT, ADJUVANTED, 65 YRS AND OLDER, IM, PREFILL SYR, 0.5ML (FLUAD TRIV)     No orders of the defined types were placed in this encounter. cont meds  With his hip discussed etiology of piriformis syndrome. Discussed stretching. Massage tens unit,  Then as pain improves educated on exercise regimen       Patient given educational materials - seepatient instructions. Discussed use, benefit, and side effects of prescribed medications. All patient questions answered. Pt voiced understanding. Patient agreed withtreatment plan. Follow up as directed.      Electronically signed by AXEL Eaton CNP on 1/25/2021 at 7:48 PM

## 2021-01-25 NOTE — PROGRESS NOTES
Immunizations Administered     Name Date Dose Route    Influenza, Triv, inactivated, subunit, adjuvanted, IM (Fluad 65 yrs and older) 1/25/2021 0.5 mL Intramuscular    Site: Deltoid- Right    Lot: [id number]    NDC: 70192-139-71    Pneumococcal Polysaccharide ([id number]) 1/25/2021 0.5 mL Intramuscular    Site: Deltoid- Right    Lot: [id number]    NDC: 0813-3127-23          VIS GIVEN. CONSENT SIGNED  PATIENT TOLERATED WELL. ABN SIGNED.

## 2021-04-28 ENCOUNTER — OFFICE VISIT (OUTPATIENT)
Dept: FAMILY MEDICINE CLINIC | Age: 76
End: 2021-04-28
Payer: MEDICARE

## 2021-04-28 ENCOUNTER — HOSPITAL ENCOUNTER (OUTPATIENT)
Dept: GENERAL RADIOLOGY | Age: 76
Discharge: HOME | End: 2021-04-28
Payer: MEDICARE

## 2021-04-28 ENCOUNTER — HOSPITAL ENCOUNTER (OUTPATIENT)
Age: 76
Discharge: HOME | End: 2021-04-28
Payer: MEDICARE

## 2021-04-28 VITALS
OXYGEN SATURATION: 98 % | WEIGHT: 160 LBS | HEIGHT: 67 IN | DIASTOLIC BLOOD PRESSURE: 74 MMHG | HEART RATE: 86 BPM | SYSTOLIC BLOOD PRESSURE: 136 MMHG | BODY MASS INDEX: 25.11 KG/M2 | RESPIRATION RATE: 18 BRPM

## 2021-04-28 DIAGNOSIS — M25.559 HIP PAIN: Primary | ICD-10-CM

## 2021-04-28 DIAGNOSIS — G57.01 PIRIFORMIS SYNDROME OF RIGHT SIDE: ICD-10-CM

## 2021-04-28 DIAGNOSIS — M25.559 HIP PAIN: ICD-10-CM

## 2021-04-28 PROCEDURE — 99214 OFFICE O/P EST MOD 30 MIN: CPT | Performed by: NURSE PRACTITIONER

## 2021-04-28 PROCEDURE — 1123F ACP DISCUSS/DSCN MKR DOCD: CPT | Performed by: NURSE PRACTITIONER

## 2021-04-28 PROCEDURE — G8417 CALC BMI ABV UP PARAM F/U: HCPCS | Performed by: NURSE PRACTITIONER

## 2021-04-28 PROCEDURE — G8427 DOCREV CUR MEDS BY ELIG CLIN: HCPCS | Performed by: NURSE PRACTITIONER

## 2021-04-28 PROCEDURE — 1036F TOBACCO NON-USER: CPT | Performed by: NURSE PRACTITIONER

## 2021-04-28 PROCEDURE — 73502 X-RAY EXAM HIP UNI 2-3 VIEWS: CPT

## 2021-04-28 PROCEDURE — 4040F PNEUMOC VAC/ADMIN/RCVD: CPT | Performed by: NURSE PRACTITIONER

## 2021-04-28 PROCEDURE — 3017F COLORECTAL CA SCREEN DOC REV: CPT | Performed by: NURSE PRACTITIONER

## 2021-04-28 ASSESSMENT — ENCOUNTER SYMPTOMS
EYES NEGATIVE: 1
RESPIRATORY NEGATIVE: 1
GASTROINTESTINAL NEGATIVE: 1

## 2021-04-28 NOTE — PROGRESS NOTES
Aisha Lange is a 70 y.o. male whopresents today for :  Chief Complaint   Patient presents with    Hip Pain     Rt side       HPI:     HPI  Pt with progressive right hip pain. Been worsening over the past several months  Particularly 3 weeks ago. Notices swelling along lateral hip.        Patient Active Problem List   Diagnosis    Prostate cancer ([facility]    Hypertension    BPH (benign prostatic hyperplasia)        Past Medical History:   Diagnosis Date    Hypertension 9/2005    Malignant neoplasm of prostate ([facility]     Osteoarthritis 2004    Osteoarthritis 2004    PRIMARY AFFECTING THE HIPS      Past Surgical History:   Procedure Laterality Date    JOINT REPLACEMENT  11/2004, 12/2008    R hip, L hip    OTHER SURGICAL HISTORY  7/8/2015    XI ROBOTIC LAPAROSCOPIC PROSTATECTOMY, PELVIC LYMPH RUI DISSECTION    PROSTATE BIOPSY  2015     Family History   Problem Relation Age of Onset    Heart Disease Father     Cancer Sister         1 with breast and 1 with (blood)    Cancer Sister      Social History     Tobacco Use    Smoking status: Never Smoker    Smokeless tobacco: Never Used   Substance Use Topics    Alcohol use: Yes     Comment: SOCIAL      Current Outpatient Medications   Medication Sig Dispense Refill    Ascorbic Acid (VITAMIN C) 1000 MG tablet Take 1,000 mg by mouth daily      Cholecalciferol (VITAMIN D3) 125 MCG (5000 UT) CAPS Take 1 capsule by mouth daily      Zinc 30 MG CAPS Take 1 capsule by mouth daily      Probiotic Product (PROBIOTIC DAILY PO) Take 1 tablet by mouth daily      lisinopril (PRINIVIL;ZESTRIL) 10 MG tablet Take 1 tablet by mouth daily 90 tablet 1    Multiple Vitamin (ONE-A-DAY MENS PO) Take by mouth      meloxicam (MOBIC) 15 MG tablet Take 15 mg by mouth daily      pantoprazole (PROTONIX) 40 MG tablet Take 1 tablet by mouth every morning (before breakfast) (Patient not taking: Reported on 1/25/2021) 30 tablet 0     No current facility-administered medications for this visit. No Known Allergies  Health Maintenance   Topic Date Due    Hepatitis C screen  Never done    COVID-19 Vaccine (1) Never done    DTaP/Tdap/Td vaccine (1 - Tdap) 12/16/1966    Shingles Vaccine (1 of 2) Never done    Colon cancer screen colonoscopy  Never done    Annual Wellness Visit (AWV)  Never done    Lipid screen  05/27/2021    PSA counseling  06/17/2021    Potassium monitoring  11/10/2021    Creatinine monitoring  11/10/2021    Flu vaccine  Completed    Pneumococcal 65+ years Vaccine  Completed    Hepatitis A vaccine  Aged Out    Hepatitis B vaccine  Aged Out    Hib vaccine  Aged Out    Meningococcal (ACWY) vaccine  Aged Out       Subjective:     Review of Systems   Constitutional: Negative. HENT: Negative. Eyes: Negative. Respiratory: Negative. Cardiovascular: Negative. Gastrointestinal: Negative. Musculoskeletal: Positive for arthralgias and joint swelling. Skin: Negative. Neurological: Negative. Objective:     Vitals:    04/28/21 1317   BP: 136/74   Site: Left Upper Arm   Position: Sitting   Cuff Size: Medium Adult   Pulse: 86   Resp: 18   SpO2: 98%   Weight: 160 lb (72.6 kg)   Height: 5' 7\" (1.702 m)       Physical Exam  Constitutional:       Appearance: He is well-developed. HENT:      Head: Normocephalic. Right Ear: Tympanic membrane and external ear normal.      Left Ear: Tympanic membrane and external ear normal.      Nose: Nose normal.   Neck:      Musculoskeletal: Normal range of motion and neck supple. Cardiovascular:      Rate and Rhythm: Normal rate and regular rhythm. Heart sounds: Normal heart sounds. No murmur. No friction rub. No gallop. Pulmonary:      Effort: Pulmonary effort is normal.      Breath sounds: Normal breath sounds. No wheezing or rales. Abdominal:      General: Bowel sounds are normal.      Palpations: Abdomen is soft. Tenderness: There is no abdominal tenderness. There is no guarding. Musculoskeletal: Normal range of motion. Legs:    Lymphadenopathy:      Cervical: No cervical adenopathy. Skin:     General: Skin is warm. Neurological:      Mental Status: He is alert and oriented to person, place, and time. Deep Tendon Reflexes: Reflexes are normal and symmetric. Assessment:      Diagnosis Orders   1. Hip pain  XR HIP RIGHT (2-3 VIEWS)   2. Piriformis syndrome of right side  XR HIP RIGHT (2-3 VIEWS)       Plan:      No follow-ups on file. Orders Placed This Encounter   Procedures    XR HIP RIGHT (2-3 VIEWS)     Standing Status:   Future     Number of Occurrences:   1     Standing Expiration Date:   4/28/2022     No orders of the defined types were placed in this encounter. Suspect piriformis syndrome, pt is getting massage. Discussed home therapy  Will get xray     Patient given educational materials - seepatient instructions. Discussed use, benefit, and side effects of prescribed medications. All patient questions answered. Pt voiced understanding. Patient agreed withtreatment plan. Follow up as directed.      Electronically signed by AXEL Gambino CNP on 4/28/2021 at 5:40 PM

## 2021-05-25 ENCOUNTER — OFFICE VISIT (OUTPATIENT)
Dept: FAMILY MEDICINE CLINIC | Age: 76
End: 2021-05-25
Payer: MEDICARE

## 2021-05-25 VITALS
HEIGHT: 67 IN | DIASTOLIC BLOOD PRESSURE: 82 MMHG | WEIGHT: 160.8 LBS | TEMPERATURE: 97.1 F | HEART RATE: 70 BPM | BODY MASS INDEX: 25.24 KG/M2 | SYSTOLIC BLOOD PRESSURE: 136 MMHG | RESPIRATION RATE: 16 BRPM

## 2021-05-25 DIAGNOSIS — C61 PROSTATE CANCER (HCC): ICD-10-CM

## 2021-05-25 DIAGNOSIS — G57.01 PIRIFORMIS SYNDROME OF RIGHT SIDE: ICD-10-CM

## 2021-05-25 DIAGNOSIS — Z00.00 ROUTINE GENERAL MEDICAL EXAMINATION AT A HEALTH CARE FACILITY: Primary | ICD-10-CM

## 2021-05-25 DIAGNOSIS — I10 ESSENTIAL HYPERTENSION: ICD-10-CM

## 2021-05-25 PROCEDURE — 3017F COLORECTAL CA SCREEN DOC REV: CPT | Performed by: NURSE PRACTITIONER

## 2021-05-25 PROCEDURE — 4040F PNEUMOC VAC/ADMIN/RCVD: CPT | Performed by: NURSE PRACTITIONER

## 2021-05-25 PROCEDURE — 1123F ACP DISCUSS/DSCN MKR DOCD: CPT | Performed by: NURSE PRACTITIONER

## 2021-05-25 PROCEDURE — G0438 PPPS, INITIAL VISIT: HCPCS | Performed by: NURSE PRACTITIONER

## 2021-05-25 ASSESSMENT — LIFESTYLE VARIABLES
HOW OFTEN DURING THE LAST YEAR HAVE YOU FOUND THAT YOU WERE NOT ABLE TO STOP DRINKING ONCE YOU HAD STARTED: 0
AUDIT TOTAL SCORE: 4
HOW OFTEN DURING THE LAST YEAR HAVE YOU NEEDED AN ALCOHOLIC DRINK FIRST THING IN THE MORNING TO GET YOURSELF GOING AFTER A NIGHT OF HEAVY DRINKING: 0
HOW OFTEN DURING THE LAST YEAR HAVE YOU HAD A FEELING OF GUILT OR REMORSE AFTER DRINKING: 0
HOW OFTEN DO YOU HAVE A DRINK CONTAINING ALCOHOL: 3
HOW OFTEN DURING THE LAST YEAR HAVE YOU FAILED TO DO WHAT WAS NORMALLY EXPECTED FROM YOU BECAUSE OF DRINKING: 0
HOW MANY STANDARD DRINKS CONTAINING ALCOHOL DO YOU HAVE ON A TYPICAL DAY: 1
HOW OFTEN DO YOU HAVE SIX OR MORE DRINKS ON ONE OCCASION: 0
HOW OFTEN DURING THE LAST YEAR HAVE YOU BEEN UNABLE TO REMEMBER WHAT HAPPENED THE NIGHT BEFORE BECAUSE YOU HAD BEEN DRINKING: 0

## 2021-05-25 ASSESSMENT — PATIENT HEALTH QUESTIONNAIRE - PHQ9
SUM OF ALL RESPONSES TO PHQ QUESTIONS 1-9: 0
2. FEELING DOWN, DEPRESSED OR HOPELESS: 0

## 2021-05-25 NOTE — PROGRESS NOTES
Medicare Annual Wellness Visit  Name: Zohaib Lindsay Date: 2021   MRN: [id number] Sex: Male   Age: 70 y.o. 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 These results, as well as other patient data from the [address] form, are documented in Flowsheets linked to this Encounter. No Known Allergies      Prior to Visit Medications    Medication Sig Taking?  Authorizing Provider   Ascorbic Acid (VITAMIN C) 1000 MG tablet Take 1,000 mg by mouth daily Yes Historical Provider, MD   Cholecalciferol (VITAMIN D3) 125 MCG (5000 UT) CAPS Take 1 capsule by mouth daily Yes Historical Provider, MD   Zinc 30 MG CAPS Take 1 capsule by mouth daily Yes Historical Provider, MD   Probiotic Product (PROBIOTIC DAILY PO) Take 1 tablet by mouth daily Yes Historical Provider, MD   lisinopril (PRINIVIL;ZESTRIL) 10 MG tablet Take 1 tablet by mouth daily Yes AXEL Gerardo CNP   Multiple Vitamin (ONE-A-DAY MENS PO) Take by mouth Yes Historical Provider, MD   pantoprazole (PROTONIX) 40 MG tablet Take 1 tablet by mouth every morning (before breakfast)  Patient not taking: Reported on 2021  AXEL Gerardo CNP   meloxicam (MOBIC) 15 MG tablet Take 15 mg by mouth daily  Patient not taking: Reported on 2021  Historical Provider, MD         Past Medical History:   Diagnosis Date    Hypertension 2005    [facility]ignant [facility] ([facility] [facility]     Osteoarthritis     Osteoarthritis     PRIMARY AFFECTING THE HIPS       Past Surgical History:   Procedure Laterality Date    JOINT REPLACEMENT  2004, 2008    R hip, L hip    OTHER SURGICAL HISTORY  2015    XI ROBOTIC LAPAROSCOPIC PROSTATECTOMY, PELVIC LYMPH RUI DISSECTION    PROSTATE BIOPSY  2015         Family History   Problem Relation Age of Onset    Heart Disease Father     Cancer Sister         1 with breast and 1 with (blood)    Cancer Sister        CareTeam (Including outside providers/suppliers regularly involved in providing care):   Patient Care Team:  AXEL Cunningham CNP as PCP - General (Nurse Practitioner)  AXEL Cunningham CNP as PCP - [facility] Empaneled Provider    Wt Readings from Last 3 Encounters:   05/25/21 160 lb 12.8 oz (72.9 kg)   04/28/21 160 lb (72.6 kg)   01/25/21 160 lb 3.2 oz (72.7 kg)     Vitals:    05/25/21 1328   BP: 136/82   Site: Left Upper Arm   Position: Sitting   Cuff Size: Medium Adult   Pulse: 70   Resp: 16   Temp: 97.1 °F (36.2 °C)   TempSrc: Temporal   Weight: 160 lb 12.8 oz (72.9 kg)   Height: 5' 7\" (1.702 m)     Body mass index is 25.18 kg/m². Based upon direct observation of the patient, evaluation of cognition reveals recent and remote memory intact.     General Appearance: alert and oriented to person, place and time, well developed and well- nourished, in no acute distress  Skin: warm and dry, no rash or erythema  Head: normocephalic and atraumatic  Eyes: pupils equal, round, and reactive to light, extraocular eye movements intact, conjunctivae normal  ENT: tympanic membrane, external ear and ear canal normal bilaterally, nose without deformity, nasal mucosa and turbinates normal without polyps  Neck: supple and non-tender without mass, no thyromegaly or thyroid nodules, no cervical lymphadenopathy  Pulmonary/Chest: clear to auscultation bilaterally- no wheezes, rales or rhonchi, normal air movement, no respiratory distress  Cardiovascular: normal rate, regular rhythm, normal S1 and S2, no murmurs, rubs, clicks, or gallops, distal pulses intact, no carotid bruits  Abdomen: soft, non-tender, non-distended, normal bowel sounds, no masses or organomegaly  Extremities: no cyanosis, clubbing or edema  Musculoskeletal: with his right hip  Tight piriformis Neurologic: reflexes normal and symmetric, no cranial nerve deficit, gait, coordination and speech normal    Patient's complete Health Risk Assessment and screening values have been reviewed and are found in Flowsheets. The following problems were reviewed today and where indicated follow up appointments were made and/or referrals ordered. Positive Risk Factor Screenings with Interventions:            General Health and ACP:  General  In general, how would you say your health is?: Very Good  In the past 7 days, have you experienced any of the following?  New or Increased Pain, New or Increased Fatigue, Loneliness, Social Isolation, Stress or Anger?: None of These  Do you get the social and emotional support that you need?: Yes  Do you have a Living Will?: Yes  Advance Directives     Power of [address] [place] [address] Will ACP-Advance Directive ACP-Power of     Not on File Not on File Not on File Filed      General Health Risk Interventions:  · discussed his chronic pain     Hearing/Vision:  No exam data present  Hearing/Vision  Do you or your family notice any trouble with your hearing that hasn't been managed with hearing aids?: (!) Yes  Do you have difficulty driving, watching TV, or doing any of your daily activities because of your eyesight?: No  Have you had an eye exam within the past year?: (!) No  Hearing/Vision Interventions:  · Hearing concerns:  patient declines any further evaluation/treatment for hearing issues    Safety:  Safety  Do you have working smoke detectors?: Yes  Have all throw rugs been removed or fastened?: Yes  Do you have non-slip mats or surfaces in all bathtubs/showers?: (!) No  Do all of your stairways have a railing or banister?: (!) No  Are your doorways, halls and stairs free of clutter?: Yes  Do you always fasten your seatbelt when you are in a car?: (!) No  Safety Interventions:  · Home safety tips provided Personalized Preventive Plan   Current Health Maintenance Status  Immunization History   Administered Date(s) Administered    Influenza, High Dose (Fluzone 65 yrs and older) 03/27/2020    Influenza, Triv, inactivated, subunit, adjuvanted, IM (Fluad 65 yrs and older) 01/25/2021    Pneumococcal Conjugate 13-valent ([facility]) 05/16/2018    Pneumococcal Polysaccharide ([id number]) 01/25/2021    Td, unspecified formulation 02/19/2009, 09/22/2014        Health Maintenance   Topic Date Due    COVID-19 Vaccine (1) Never done    DTaP/Tdap/Td vaccine (1 - Tdap) 12/16/1966    Shingles Vaccine (1 of 2) Never done    Colon cancer screen colonoscopy  Never done    Annual Wellness Visit (AWV)  Never done    Lipid screen  05/27/2021    PSA counseling  06/17/2021    Potassium monitoring  11/10/2021    Creatinine monitoring  11/10/2021    Flu vaccine  Completed    Pneumococcal 65+ years Vaccine  Completed    Hepatitis A vaccine  Aged Out    Hepatitis B vaccine  Aged Out    Hib vaccine  Aged Out    Meningococcal (ACWY) vaccine  Aged Out    Hepatitis C screen  Discontinued     Recommendations for [employer] Due: see orders and patient instructions/AVS.  . Recommended screening schedule for the next 5-10 years is provided to the patient in written form: see Patient Instructions/AVS.    Ruth Almanza was seen today for medicare awv and other. Diagnoses and all orders for this visit:    Routine general medical examination at a health care facility    Piriformis syndrome of right side  Discussed stretching and massage  Essential hypertension  stable  Prostate cancer ([address]  Follows with oncology    Pt goes to [place] for labs and cologuard.   Advised to get results  Discussed vaccines

## 2021-05-25 NOTE — PATIENT INSTRUCTIONS
Personalized Preventive Plan for Jaclyn Rowell - 5/25/2021  Medicare offers a range of preventive health benefits. Some of the tests and screenings are paid in full while other may be subject to a deductible, co-insurance, and/or copay. Some of these benefits include a comprehensive review of your medical history including lifestyle, illnesses that may run in your family, and various assessments and screenings as appropriate. After reviewing your medical record and screening and assessments performed today your provider may have ordered immunizations, labs, imaging, and/or referrals for you. A list of these orders (if applicable) as well as your Preventive Care list are included within your After Visit Summary for your review. Other Preventive Recommendations:    · A preventive eye exam performed by an eye specialist is recommended every 1-2 years to screen for glaucoma; cataracts, macular degeneration, and other eye disorders. · A preventive dental visit is recommended every 6 months. · Try to get at least 150 minutes of exercise per week or 10,000 steps per day on a pedometer . · Order or download the FREE \"Exercise & Physical Activity: Your Everyday Guide\" from [employer]. Call 6-493.290.2268 or search [employer] online. · You need 4051-5172 mg of calcium and 4252-7496 IU of vitamin D per day. It is possible to meet your calcium requirement with diet alone, but a vitamin D supplement is usually necessary to meet this goal.  · When exposed to the sun, use a sunscreen that protects against both UVA and UVB radiation with an SPF of 30 or greater. Reapply every 2 to 3 hours or after sweating, drying off with a towel, or swimming. · Always wear a seat belt when traveling in a car. Always wear a helmet when riding a bicycle or motorcycle.

## 2021-06-15 ENCOUNTER — TELEPHONE (OUTPATIENT)
Dept: FAMILY MEDICINE CLINIC | Age: 76
End: 2021-06-15

## 2021-06-16 ENCOUNTER — NURSE ONLY (OUTPATIENT)
Dept: LAB | Age: 76
End: 2021-06-16

## 2021-06-16 ENCOUNTER — HOSPITAL ENCOUNTER (OUTPATIENT)
Dept: CT IMAGING | Age: 76
Discharge: HOME | End: 2021-06-16
Payer: MEDICARE

## 2021-06-16 ENCOUNTER — OFFICE VISIT (OUTPATIENT)
Dept: FAMILY MEDICINE CLINIC | Age: 76
End: 2021-06-16
Payer: MEDICARE

## 2021-06-16 VITALS
WEIGHT: 156 LBS | HEART RATE: 85 BPM | BODY MASS INDEX: 24.43 KG/M2 | SYSTOLIC BLOOD PRESSURE: 114 MMHG | TEMPERATURE: 97.6 F | DIASTOLIC BLOOD PRESSURE: 60 MMHG | RESPIRATION RATE: 16 BRPM

## 2021-06-16 DIAGNOSIS — R35.0 URINARY FREQUENCY: Primary | ICD-10-CM

## 2021-06-16 DIAGNOSIS — N39.0 RECURRENT UTI: ICD-10-CM

## 2021-06-16 DIAGNOSIS — R31.9 HEMATURIA, UNSPECIFIED TYPE: ICD-10-CM

## 2021-06-16 DIAGNOSIS — R35.0 URINARY FREQUENCY: ICD-10-CM

## 2021-06-16 LAB
ALBUMIN SERPL-MCNC: 2.4 GM/DL (ref 3.4–5)
ALP BLD-CCNC: 151 U/L (ref 46–116)
ALT SERPL-CCNC: 44 U/L (ref 14–63)
ANION GAP: 9 MEQ/L (ref 8–16)
AST SERPL-CCNC: 20 U/L (ref 15–37)
BILIRUB SERPL-MCNC: 0.7 MG/DL (ref 0.2–1)
BILIRUBIN URINE: ABNORMAL
BLOOD URINE, POC: ABNORMAL
BUN BLDV-MCNC: 18 MG/DL (ref 7–18)
C-REACTIVE PROTEIN: 22.41 MG/DL (ref 0–1)
CHARACTER, URINE: CLEAR
CHLORIDE BLD-SCNC: 99 MEQ/L (ref 98–107)
CO2: 26 MEQ/L (ref 21–32)
COLOR, URINE: ABNORMAL
CREAT SERPL-MCNC: 1.5 MG/DL (ref 0.6–1.3)
GFR, ESTIMATED: 49 ML/MIN/1.73M2
GLUCOSE BLD-MCNC: 109 MG/DL (ref 74–106)
GLUCOSE URINE: NEGATIVE MG/DL
HCT VFR BLD CALC: 33.4 % (ref 42–52)
HEMOGLOBIN: 10.8 GM/DL (ref 14–18)
KETONES, URINE: NEGATIVE
LEUKOCYTE CLUMPS, URINE: NEGATIVE
MCH RBC QN AUTO: 25 PG (ref 27–31)
MCHC RBC AUTO-ENTMCNC: 32.3 GM/DL (ref 33–37)
MCV RBC AUTO: 77.7 FL (ref 80–94)
NITRITE, URINE: NEGATIVE
PDW BLD-RTO: 15.1 % (ref 11.5–14.5)
PH, URINE: 6 (ref 5–9)
PLATELET # BLD: 409 THOU/MM3 (ref 130–400)
PMV BLD AUTO: 7.6 FL (ref 7.4–10.4)
POC CALCIUM: 9 MG/DL (ref 8.5–10.1)
POTASSIUM SERPL-SCNC: 4.5 MEQ/L (ref 3.5–5.1)
PROSTATE SPECIFIC ANTIGEN: < 0.02 NG/ML (ref 0–1)
PROTEIN, URINE: 100 MG/DL
RBC # BLD: 4.3 MILL/MM3 (ref 4.7–6.1)
SEDIMENTATION RATE, ERYTHROCYTE: 86 MM/HR (ref 0–10)
SODIUM BLD-SCNC: 134 MEQ/L (ref 136–145)
SPECIFIC GRAVITY, URINE: 1.02 (ref 1–1.03)
TOTAL PROTEIN: 7.6 GM/DL (ref 6.4–8.2)
UROBILINOGEN, URINE: 0.2 EU/DL (ref 0–1)
WBC # BLD: 17 THOU/MM3 (ref 4.8–10.8)

## 2021-06-16 PROCEDURE — 81003 URINALYSIS AUTO W/O SCOPE: CPT | Performed by: FAMILY MEDICINE

## 2021-06-16 PROCEDURE — 3017F COLORECTAL CA SCREEN DOC REV: CPT | Performed by: FAMILY MEDICINE

## 2021-06-16 PROCEDURE — 1123F ACP DISCUSS/DSCN MKR DOCD: CPT | Performed by: FAMILY MEDICINE

## 2021-06-16 PROCEDURE — 4040F PNEUMOC VAC/ADMIN/RCVD: CPT | Performed by: FAMILY MEDICINE

## 2021-06-16 PROCEDURE — G8420 CALC BMI NORM PARAMETERS: HCPCS | Performed by: FAMILY MEDICINE

## 2021-06-16 PROCEDURE — 74178 CT ABD&PLV WO CNTR FLWD CNTR: CPT

## 2021-06-16 PROCEDURE — 6360000004 HC RX CONTRAST MEDICATION: Performed by: FAMILY MEDICINE

## 2021-06-16 PROCEDURE — G8427 DOCREV CUR MEDS BY ELIG CLIN: HCPCS | Performed by: FAMILY MEDICINE

## 2021-06-16 PROCEDURE — 1036F TOBACCO NON-USER: CPT | Performed by: FAMILY MEDICINE

## 2021-06-16 PROCEDURE — 99215 OFFICE O/P EST HI 40 MIN: CPT | Performed by: FAMILY MEDICINE

## 2021-06-16 RX ADMIN — IOHEXOL 50 ML: 240 INJECTION, SOLUTION INTRATHECAL; INTRAVASCULAR; INTRAVENOUS; ORAL at 12:45

## 2021-06-16 RX ADMIN — IOPAMIDOL 100 ML: 755 INJECTION, SOLUTION INTRAVENOUS at 12:53

## 2021-06-16 SDOH — ECONOMIC STABILITY: FOOD INSECURITY: WITHIN THE PAST 12 MONTHS, YOU WORRIED THAT YOUR FOOD WOULD RUN OUT BEFORE YOU GOT MONEY TO BUY MORE.: NEVER TRUE

## 2021-06-16 SDOH — ECONOMIC STABILITY: FOOD INSECURITY: WITHIN THE PAST 12 MONTHS, THE FOOD YOU BOUGHT JUST DIDN'T LAST AND YOU DIDN'T HAVE MONEY TO GET MORE.: NEVER TRUE

## 2021-06-16 ASSESSMENT — SOCIAL DETERMINANTS OF HEALTH (SDOH): HOW HARD IS IT FOR YOU TO PAY FOR THE VERY BASICS LIKE FOOD, HOUSING, MEDICAL CARE, AND HEATING?: NOT HARD AT ALL

## 2021-06-16 NOTE — PROGRESS NOTES
[place] [address] [address]  Dept: [phone]  Dept Fax: [phone]  Loc: [phone]    Tammy Dye is a 70 y.o. male who presents today for:  Chief Complaint   Patient presents with    Fatigue     x 1 week. Patient states he was seen at an urgent care in [place] started on cirpo for UTI which did not help now taking bactrim. Urine culture was normal     Chills    Urinary Frequency    Abdominal Pain           HPI:     HPI  Here for 1.5 week with fatigue and UTI. Evaluated in the ER in [place]. At a UC 3 days later he was diagnosed with UTI. Given cipro on 6/9/21 and bactrim on 6/14. He is still not feeling much better. LLQ pain sharp on 6/10 but gone now. Associated with SOB/fatigue with exertion, soreness in the abdomen, lower appetite, urinary frequency last night up 6 times, BM daily but small and more frequent, chills initially are better. Tried:  Tylenol and motrin prn, pushing more water. Reviewed chart forpast medical history , surgical history , allergies, social history , family history and medications. Health Maintenance   Topic Date Due    DTaP/Tdap/Td vaccine (1 - Tdap) 12/16/1966    Shingles Vaccine (1 of 2) Never done    Colon cancer screen colonoscopy  Never done    Lipid screen  05/27/2021    Annual Wellness Visit (AWV)  05/26/2022    Potassium monitoring  06/16/2022    Creatinine monitoring  06/16/2022    PSA counseling  06/16/2022    Flu vaccine  Completed    Pneumococcal 65+ years Vaccine  Completed    COVID-19 Vaccine  Completed    Hepatitis A vaccine  Aged Out    Hepatitis B vaccine  Aged Out    Hib vaccine  Aged Out    Meningococcal (ACWY) vaccine  Aged Out    Hepatitis C screen  Discontinued       Subjective:      Constitutional:Negative for fever, chills, diaphoresis, activity change, appetite change and fatigue.    HENT: Negative for hearing loss, ear pain, congestion, sore throat, rhinorrhea, postnasal drip and ear discharge. Eyes: Negative for photophobia and visual disturbance. Respiratory: Negative for cough, chest tightness, shortness of breath and wheezing. Cardiovascular: Negative for chest pain and leg swelling. Gastrointestinal: Negative for nausea, vomiting, abdominal pain, diarrhea and constipation. Genitourinary: Negative for dysuria, urgency and frequency. Neurological: Negative for weakness, light-headedness and headaches. Psychiatric/Behavioral: Negative for sleep disturbance.      :     Vitals:    06/16/21 0846   BP: 114/60   Site: Left Upper Arm   Position: Sitting   Cuff Size: Medium Adult   Pulse: 85   Resp: 16   Temp: 97.6 °F (36.4 °C)   TempSrc: Temporal   Weight: 156 lb (70.8 kg)     Wt Readings from Last 3 Encounters:   06/16/21 156 lb (70.8 kg)   05/25/21 160 lb 12.8 oz (72.9 kg)   04/28/21 160 lb (72.6 kg)       Physical Exam  Constitutional: Vital signs are normal. He appears well-developed and well-nourished. He is active. HENT:   Head: Normocephalic and atraumatic. Right Ear: Tympanic membrane, external ear and ear canal normal. No drainage or tenderness. Left Ear: Tympanic membrane, external ear and ear canal normal. No drainage or tenderness. Nose: Nose normal. No mucosal edema or rhinorrhea. Mouth/Throat: Uvula is midline, oropharynx is clear and moist and mucous membranes are normal. Mucous membranes are not pale. Normal dentition. No posterior oropharyngeal edema or posterior oropharyngeal erythema. Eyes: Lids are normal. Right eye exhibits no chemosis and no discharge. Left eye exhibits no chemosis and no drainage. Right conjunctiva has no hemorrhage. Left conjunctiva has no hemorrhage. Right eye exhibits normal extraocular motion. Left eye exhibits normal extraocular motion. Right pupil is round and reactive. Left pupil is round and reactive.  Pupils are equal.   Cardiovascular: Normal rate, regular rhythm, S1 normal, S2 normal and normal heart sounds. Exam reveals no gallop. No murmur heard. Pulmonary/Chest: Effort normal and breath sounds normal. No respiratory distress. He has no wheezes. He has no rhonchi. He has no rales. Abdominal: Soft. Normal appearance and bowel sounds are normal. He exhibits no distension and no mass. There is no hepatosplenomegaly. No tenderness. He has no rigidity, no rebound and no guarding. No hernia. Musculoskeletal:        Right lower leg: He exhibits no edema. Left lower leg: He exhibits no edema. Neurological: He is alert. Oriented and pleasent    Results for POC orders placed in visit on 06/16/21   POCT Urinalysis No Micro (Auto)   Result Value Ref Range    Glucose, Ur Negative NEGATIVE mg/dl    Bilirubin Urine Small (A)     Ketones, Urine Negative NEGATIVE    Specific Gravity, Urine 1.025 1.002 - 1.030    Blood, UA POC Moderate (A) NEGATIVE    pH, Urine 6.00 5.0 - 9.0    Protein, Urine 100 (A) NEGATIVE mg/dl    Urobilinogen, Urine 0.20 0.0 - 1.0 eu/dl    Nitrite, Urine Negative NEGATIVE    Leukocyte Clumps, Urine Negative NEGATIVE    Color, Urine Dark yellow (A) [id number]-STRAW    Character, Urine Clear [id number]         Assessment/Plan   Griselda Freeze was seen today for fatigue, chills, urinary frequency and abdominal pain. Diagnoses and all orders for this visit:    Urinary frequency  -     POCT Urinalysis No Micro (Auto)  -     Cancel: CBC; Future  -     Cancel: Comprehensive Metabolic Panel, Fasting; Future  -     Cancel: Sedimentation Rate; Future  -     C-Reactive Protein; Future  -     PSA Prostatic Specific Antigen; Future  -     Cancel: CT ABDOMEN PELVIS W IV CONTRAST Additional Contrast? None; Future    Hematuria, unspecified type  -     Cancel: CBC; Future  -     Cancel: Comprehensive Metabolic Panel, Fasting; Future  -     Cancel: Sedimentation Rate; Future  -     C-Reactive Protein; Future  -     PSA Prostatic Specific Antigen;  Future  -     Cancel: CT ABDOMEN PELVIS W IV CONTRAST Additional Contrast? None; Future    Recurrent UTI  -     Cancel: CBC; Future  -     Cancel: Comprehensive Metabolic Panel, Fasting; Future  -     Cancel: Sedimentation Rate; Future  -     C-Reactive Protein; Future  -     PSA Prostatic Specific Antigen; Future  -     Cancel: CT ABDOMEN PELVIS W IV CONTRAST Additional Contrast? None; Future    will treat based on results of testing    After results of the CT returned. He was found to have a cystic mass on the left pelvis and signs of inflammation in the bladder as well as lymph nodes in the abdomen enlarged. Labs showed elevated SED rate and cbc is highly abnormal.  I discussed this at length with him and his wife. We will move forward with urgent urology referral which is scheduled for 6-18 morning. He is informed that he will likely need a biopsy/cyst drainage to fully diagnose the mass. He will continue the antibiotic until that visit. Discussed use, benefit, and side effectsof prescribed medications. All patient questions answered. Pt voiced understanding. Reviewed health maintenance. Instructed to continue current medications, diet and exercise. Patient agreed with treatment plan. Followup as directed.      Over 50 minutes spent with patient with >50% spent in counseling and coordination of care    Electronically signed by Izabela Aguilar MD

## 2021-06-17 ENCOUNTER — TELEPHONE (OUTPATIENT)
Dept: FAMILY MEDICINE CLINIC | Age: 76
End: 2021-06-17

## 2021-06-21 ENCOUNTER — HOSPITAL ENCOUNTER (OUTPATIENT)
Dept: CT IMAGING | Age: 76
Discharge: HOME | End: 2021-06-21
Payer: MEDICARE

## 2021-06-21 ENCOUNTER — OFFICE VISIT (OUTPATIENT)
Dept: FAMILY MEDICINE CLINIC | Age: 76
End: 2021-06-21
Payer: MEDICARE

## 2021-06-21 VITALS
RESPIRATION RATE: 16 BRPM | BODY MASS INDEX: 25.74 KG/M2 | HEIGHT: 67 IN | OXYGEN SATURATION: 96 % | WEIGHT: 164 LBS | HEART RATE: 72 BPM | TEMPERATURE: 100.4 F | SYSTOLIC BLOOD PRESSURE: 110 MMHG | DIASTOLIC BLOOD PRESSURE: 58 MMHG

## 2021-06-21 DIAGNOSIS — R05.9 COUGH: Primary | ICD-10-CM

## 2021-06-21 DIAGNOSIS — R05.9 COUGH: ICD-10-CM

## 2021-06-21 DIAGNOSIS — R50.9 FEVER, UNSPECIFIED FEVER CAUSE: ICD-10-CM

## 2021-06-21 DIAGNOSIS — R78.81 BACTEREMIA: ICD-10-CM

## 2021-06-21 DIAGNOSIS — R10.2 PELVIC PAIN: ICD-10-CM

## 2021-06-21 LAB
BASOPHILS # BLD: 0.3 %
BASOPHILS ABSOLUTE: 0 THOU/MM3 (ref 0–0.1)
BILIRUBIN URINE: NEGATIVE
BLOOD URINE, POC: ABNORMAL
CHARACTER, URINE: CLEAR
COLOR, URINE: ABNORMAL
CREATININE, WHOLE BLOOD: 0.9 MG/DL (ref 0.5–1.2)
EOSINOPHIL # BLD: 0.8 %
EOSINOPHILS ABSOLUTE: 0.1 THOU/MM3 (ref 0–0.4)
ERYTHROCYTE [DISTWIDTH] IN BLOOD BY AUTOMATED COUNT: 14.6 % (ref 11.5–14.5)
ERYTHROCYTE [DISTWIDTH] IN BLOOD BY AUTOMATED COUNT: 44.6 FL (ref 35–45)
ESTIMATED GFR, PCACC: 88 ML/MIN/1.73M2
GLUCOSE URINE: NEGATIVE MG/DL
HCT VFR BLD CALC: 32.6 % (ref 42–52)
HEMOGLOBIN: 9.7 GM/DL (ref 14–18)
IMMATURE GRANS (ABS): 0.09 THOU/MM3 (ref 0–0.07)
IMMATURE GRANULOCYTES: 0.6 %
KETONES, URINE: NEGATIVE
LEUKOCYTE CLUMPS, URINE: NEGATIVE
LYMPHOCYTES # BLD: 6.3 %
LYMPHOCYTES ABSOLUTE: 0.9 THOU/MM3 (ref 1–4.8)
MCH RBC QN AUTO: 24.9 PG (ref 26–33)
MCHC RBC AUTO-ENTMCNC: 29.8 GM/DL (ref 32.2–35.5)
MCV RBC AUTO: 83.8 FL (ref 80–94)
MONOCYTES # BLD: 10.4 %
MONOCYTES ABSOLUTE: 1.5 THOU/MM3 (ref 0.4–1.3)
NITRITE, URINE: NEGATIVE
NUCLEATED RED BLOOD CELLS: 0 /100 WBC
PH, URINE: 6 (ref 5–9)
PLATELET # BLD: 408 THOU/MM3 (ref 130–400)
PMV BLD AUTO: 10.4 FL (ref 9.4–12.4)
PROTEIN, URINE: 100 MG/DL
RBC # BLD: 3.89 MILL/MM3 (ref 4.7–6.1)
SEDIMENTATION RATE, ERYTHROCYTE: 94 MM/HR (ref 0–10)
SEG NEUTROPHILS: 81.6 %
SEGMENTED NEUTROPHILS ABSOLUTE COUNT: 11.8 THOU/MM3 (ref 1.8–7.7)
SPECIFIC GRAVITY, URINE: 1.01 (ref 1–1.03)
UROBILINOGEN, URINE: 0.2 EU/DL (ref 0–1)
WBC # BLD: 14.5 THOU/MM3 (ref 4.8–10.8)

## 2021-06-21 PROCEDURE — 4040F PNEUMOC VAC/ADMIN/RCVD: CPT | Performed by: NURSE PRACTITIONER

## 2021-06-21 PROCEDURE — 74177 CT ABD & PELVIS W/CONTRAST: CPT

## 2021-06-21 PROCEDURE — 6360000004 HC RX CONTRAST MEDICATION: Performed by: NURSE PRACTITIONER

## 2021-06-21 PROCEDURE — 96372 THER/PROPH/DIAG INJ SC/IM: CPT | Performed by: NURSE PRACTITIONER

## 2021-06-21 PROCEDURE — 99215 OFFICE O/P EST HI 40 MIN: CPT | Performed by: NURSE PRACTITIONER

## 2021-06-21 PROCEDURE — 81003 URINALYSIS AUTO W/O SCOPE: CPT | Performed by: NURSE PRACTITIONER

## 2021-06-21 PROCEDURE — G8417 CALC BMI ABV UP PARAM F/U: HCPCS | Performed by: NURSE PRACTITIONER

## 2021-06-21 PROCEDURE — 71260 CT THORAX DX C+: CPT

## 2021-06-21 PROCEDURE — 1111F DSCHRG MED/CURRENT MED MERGE: CPT | Performed by: NURSE PRACTITIONER

## 2021-06-21 PROCEDURE — G8427 DOCREV CUR MEDS BY ELIG CLIN: HCPCS | Performed by: NURSE PRACTITIONER

## 2021-06-21 PROCEDURE — 1123F ACP DISCUSS/DSCN MKR DOCD: CPT | Performed by: NURSE PRACTITIONER

## 2021-06-21 PROCEDURE — 1036F TOBACCO NON-USER: CPT | Performed by: NURSE PRACTITIONER

## 2021-06-21 PROCEDURE — 3017F COLORECTAL CA SCREEN DOC REV: CPT | Performed by: NURSE PRACTITIONER

## 2021-06-21 RX ORDER — CEFTRIAXONE 1 G/1
1000 INJECTION, POWDER, FOR SOLUTION INTRAMUSCULAR; INTRAVENOUS ONCE
Status: COMPLETED | OUTPATIENT
Start: 2021-06-21 | End: 2021-06-21

## 2021-06-21 RX ORDER — CEPHALEXIN 500 MG/1
500 CAPSULE ORAL 2 TIMES DAILY
COMMUNITY
End: 2021-07-01

## 2021-06-21 RX ADMIN — IOPAMIDOL 100 ML: 755 INJECTION, SOLUTION INTRAVENOUS at 18:05

## 2021-06-21 RX ADMIN — CEFTRIAXONE 1000 MG: 1 INJECTION, POWDER, FOR SOLUTION INTRAMUSCULAR; INTRAVENOUS at 16:18

## 2021-06-21 RX ADMIN — IOHEXOL 50 ML: 240 INJECTION, SOLUTION INTRATHECAL; INTRAVASCULAR; INTRAVENOUS; ORAL at 16:40

## 2021-06-21 ASSESSMENT — ENCOUNTER SYMPTOMS
NAUSEA: 1
COUGH: 1
EYES NEGATIVE: 1
ABDOMINAL PAIN: 1

## 2021-06-21 NOTE — PROGRESS NOTES
Administrations This Visit     cefTRIAXone (ROCEPHIN) injection 1,000 mg     Admin Date  06/21/2021  16:18 Action  Given Dose  1,000 mg Route  Intramuscular Site  Dorsogluteal Right Administered By  Yokasta Fowler    Ordering Provider: AXEL Mak CNP    NDC: 1581-6830-96    Lot#: [id number]    : Brittany Ramirez    Patient Supplied?: No            Patient tolerated well
tablet Take 1,000 mg by mouth daily      Cholecalciferol (VITAMIN D3) 125 MCG (5000 UT) CAPS Take 1 capsule by mouth daily      Multiple Vitamin (ONE-A-DAY MENS PO) Take by mouth          Medications patient taking as of now reconciled against medications ordered at time of hospital discharge: Yes    Chief Complaint   Patient presents with   [address] from [place] [facility] 6/16-6/18 UTI, Sepsis    Foot Swelling     Bilateral, left is worse       HPI  Pt first became ill around 6/6 while in [facility]. Lower abd pressure and dysuria. Went to ER. Discharged on no med. No cause found. On 6/9 went to UC/  Same symptoms dx with UTI and started on cipro. Called then back on 6/14 as no improvement and changed to bactrim. On 6/16 severe chills and fever. Went to ER. Dx with bacteremia. Ct showed cystic mass on pelvis. Treated with zosym until 6/18 and discharged. Still does not feel well. Very tired,  Nausea, fever, lower abd pain and pressure. Pain into left testicle. Rash over his back. He has developed a cough in the past few days. Just no appetite. Inpatient course: Discharge summary reviewed- see chart. Interval history/Current status: not worsening but not improving     Review of Systems   Constitutional: Positive for activity change, appetite change, diaphoresis, fatigue and fever. HENT: Negative. Eyes: Negative. Respiratory: Positive for cough. Cardiovascular: Negative. Gastrointestinal: Positive for abdominal pain and nausea. Musculoskeletal: Negative. Skin: Negative. Neurological: Negative. Vitals:    06/21/21 1448   BP: (!) 110/58   Site: Left Upper Arm   Position: Sitting   Cuff Size: Medium Adult   Pulse: 72   Resp: 16   Temp: 100.4 °F (38 °C)   TempSrc: Temporal   SpO2: 96%   Weight: 164 lb (74.4 kg)   Height: 5' 7.01\" (1.702 m)     Body mass index is 25.68 kg/m².    Wt Readings from Last 3 Encounters:   06/21/21 164 lb (74.4 kg)   06/16/21 156 lb (70.8

## 2021-06-22 LAB
ALBUMIN SERPL-MCNC: 3.3 G/DL (ref 3.5–5.1)
ALP BLD-CCNC: 185 U/L (ref 38–126)
ALT SERPL-CCNC: 56 U/L (ref 11–66)
ANION GAP SERPL CALCULATED.3IONS-SCNC: 14 MEQ/L (ref 8–16)
AST SERPL-CCNC: 41 U/L (ref 5–40)
BILIRUB SERPL-MCNC: 0.8 MG/DL (ref 0.3–1.2)
BUN BLDV-MCNC: 10 MG/DL (ref 7–22)
C-REACTIVE PROTEIN: 26.04 MG/DL (ref 0–1)
CALCIUM SERPL-MCNC: 9.1 MG/DL (ref 8.5–10.5)
CHLORIDE BLD-SCNC: 94 MEQ/L (ref 98–111)
CO2: 25 MEQ/L (ref 23–33)
GLUCOSE BLD-MCNC: 100 MG/DL (ref 70–108)
POTASSIUM SERPL-SCNC: 3.9 MEQ/L (ref 3.5–5.2)
SODIUM BLD-SCNC: 133 MEQ/L (ref 135–145)
TOTAL PROTEIN: 7.6 G/DL (ref 6.1–8)

## 2021-06-25 LAB
ALBUMIN SERPL-MCNC: 3.2 G/DL
ALP BLD-CCNC: 98 U/L
ALT SERPL-CCNC: 40 U/L
ANION GAP SERPL CALCULATED.3IONS-SCNC: 12 MMOL/L
AST SERPL-CCNC: 20 U/L
BASOPHILS ABSOLUTE: 0 /ΜL
BASOPHILS ABSOLUTE: 0 /ΜL
BASOPHILS RELATIVE PERCENT: 0.7 %
BASOPHILS RELATIVE PERCENT: 0.9 %
BILIRUB SERPL-MCNC: 0.4 MG/DL (ref 0.1–1.4)
BUN BLDV-MCNC: 16 MG/DL
CALCIUM SERPL-MCNC: 9.1 MG/DL
CHLORIDE BLD-SCNC: 102 MMOL/L
CO2: 26 MMOL/L
CREAT SERPL-MCNC: 1.1 MG/DL
EOSINOPHILS ABSOLUTE: 0.1 /ΜL
EOSINOPHILS ABSOLUTE: 0.3 /ΜL
EOSINOPHILS RELATIVE PERCENT: 1.6 %
EOSINOPHILS RELATIVE PERCENT: 4.2 %
GFR CALCULATED: 60
GLUCOSE BLD-MCNC: 101 MG/DL
HCT VFR BLD CALC: 26.7 % (ref 41–53)
HCT VFR BLD CALC: 40.8 % (ref 41–53)
HEMOGLOBIN: 13 G/DL (ref 13.5–17.5)
HEMOGLOBIN: 8.7 G/DL (ref 13.5–17.5)
LYMPHOCYTES ABSOLUTE: 0.4 /ΜL
LYMPHOCYTES ABSOLUTE: 0.7 /ΜL
LYMPHOCYTES RELATIVE PERCENT: 7.8 %
LYMPHOCYTES RELATIVE PERCENT: 9.7 %
MCH RBC QN AUTO: 25.2 PG
MCH RBC QN AUTO: 25.3 PG
MCHC RBC AUTO-ENTMCNC: 32 G/DL
MCHC RBC AUTO-ENTMCNC: 32.4 G/DL
MCV RBC AUTO: 77.9 FL
MCV RBC AUTO: 78.9 FL
MONOCYTES ABSOLUTE: 0.5 /ΜL
MONOCYTES ABSOLUTE: 0.6 /ΜL
MONOCYTES RELATIVE PERCENT: 8.6 %
MONOCYTES RELATIVE PERCENT: 9 %
NEUTROPHILS ABSOLUTE: 4.6 /ΜL
NEUTROPHILS ABSOLUTE: 5.6 /ΜL
NEUTROPHILS RELATIVE PERCENT: 76.8 %
NEUTROPHILS RELATIVE PERCENT: 80.7 %
PLATELET # BLD: 281 K/ΜL
PLATELET # BLD: 476 K/ΜL
PMV BLD AUTO: 6.7 FL
PMV BLD AUTO: 6.9 FL
POTASSIUM SERPL-SCNC: 4.3 MMOL/L
RBC # BLD: 3.43 10^6/ΜL
RBC # BLD: 5.17 10^6/ΜL
SEDIMENTATION RATE, ERYTHROCYTE: 82
SODIUM BLD-SCNC: 136 MMOL/L
TOTAL PROTEIN: 7.5
WBC # BLD: 5.7 10^3/ML
WBC # BLD: 7.3 10^3/ML

## 2021-06-29 ENCOUNTER — TELEPHONE (OUTPATIENT)
Dept: FAMILY MEDICINE CLINIC | Age: 76
End: 2021-06-29

## 2021-07-01 ENCOUNTER — NURSE ONLY (OUTPATIENT)
Dept: LAB | Age: 76
End: 2021-07-01

## 2021-07-01 ENCOUNTER — OFFICE VISIT (OUTPATIENT)
Dept: FAMILY MEDICINE CLINIC | Age: 76
End: 2021-07-01
Payer: MEDICARE

## 2021-07-01 VITALS
RESPIRATION RATE: 20 BRPM | HEART RATE: 81 BPM | BODY MASS INDEX: 25.05 KG/M2 | TEMPERATURE: 97.3 F | OXYGEN SATURATION: 98 % | WEIGHT: 160 LBS | SYSTOLIC BLOOD PRESSURE: 120 MMHG | DIASTOLIC BLOOD PRESSURE: 70 MMHG

## 2021-07-01 DIAGNOSIS — D64.9 ANEMIA, UNSPECIFIED TYPE: ICD-10-CM

## 2021-07-01 DIAGNOSIS — L02.211 ABSCESS OF ABDOMINAL WALL: ICD-10-CM

## 2021-07-01 DIAGNOSIS — R78.81 BACTEREMIA: Primary | ICD-10-CM

## 2021-07-01 PROCEDURE — 99215 OFFICE O/P EST HI 40 MIN: CPT | Performed by: NURSE PRACTITIONER

## 2021-07-01 PROCEDURE — 1111F DSCHRG MED/CURRENT MED MERGE: CPT | Performed by: NURSE PRACTITIONER

## 2021-07-01 RX ORDER — FERROUS SULFATE 325(65) MG
325 TABLET ORAL
COMMUNITY
End: 2021-07-01

## 2021-07-01 RX ORDER — LANOLIN ALCOHOL/MO/W.PET/CERES
325 CREAM (GRAM) TOPICAL 2 TIMES DAILY
Qty: 60 TABLET | Refills: 5 | Status: SHIPPED | OUTPATIENT
Start: 2021-07-01 | End: 2022-07-19

## 2021-07-01 RX ORDER — CEFTRIAXONE 2 G/1
INJECTION, POWDER, FOR SOLUTION INTRAMUSCULAR; INTRAVENOUS
COMMUNITY
End: 2022-07-19

## 2021-07-01 ASSESSMENT — ENCOUNTER SYMPTOMS
GASTROINTESTINAL NEGATIVE: 1
RESPIRATORY NEGATIVE: 1
EYES NEGATIVE: 1

## 2021-07-01 NOTE — PROGRESS NOTES
Post-Discharge Transitional Care Management Services or Hospital Follow Up      Wayna Osgood   YOB: 1945    Date of Office Visit:  7/1/2021  Date of Hospital Admission: 6/21  Date of Hospital Discharge: 6/24    Care management risk score Rising risk (score 2-5) and Complex Care (Scores >=6): 0     Non face to face  following discharge, date last encounter closed (first attempt may have been earlier): *No documented post hospital discharge outreach found in the last 14 days     Call initiated 2 business days of discharge: *No response recorded in the last 14 days    Patient Active Problem List   Diagnosis    Prostate cancer ([facility] [address]    Hypertension    BPH (benign prostatic hyperplasia)       No Known Allergies    Medications listed as ordered at the time of discharge from hospital  On rocephin now     Medications marked \"taking\" at this time  Outpatient Medications Marked as Taking for the 7/1/21 encounter (Office Visit) with Cat Or, APRN - CNP   Medication Sig Dispense Refill    cefTRIAXone sodium 2 g SOLR Infuse intravenously      ferrous sulfate (FE TABS) 325 (65 Fe) MG EC tablet Take 1 tablet by mouth 2 times daily 60 tablet 5    Ascorbic Acid (VITAMIN C) 1000 MG tablet Take 1,000 mg by mouth daily      Cholecalciferol (VITAMIN D3) 125 MCG (5000 UT) CAPS Take 1 capsule by mouth daily      Zinc 30 MG CAPS Take 1 capsule by mouth daily       Probiotic Product (PROBIOTIC DAILY PO) Take 1 tablet by mouth daily       lisinopril (PRINIVIL;ZESTRIL) 10 MG tablet Take 1 tablet by mouth daily 90 tablet 1    Multiple Vitamin (ONE-A-DAY MENS PO) Take by mouth          Medications patient taking as of now reconciled against medications ordered at time of hospital discharge: Yes    Chief Complaint   Patient presents with    Follow-Up from Hospital       HPI  Pt originally admitted for uti. Was discharged after 2 days. I saw and symptoms were worsening  Had ct that showed abd abscess. Admitted. Had drain and started IV antibiotics  Inpatient course: Discharge summary reviewed- see chart. Interval history/Current status: pt feels abd pressure is improved. Cont with some urinary frequency. Is feeling tired. No fever. Pt had anemia while in hospital.  Not sure of cause  ?zosyn. Is following with hematology and ID. Vitals:    07/01/21 1043   BP: 120/70   Site: Left Upper Arm   Position: Sitting   Cuff Size: Large Adult   Pulse: 81   Resp: 20   Temp: 97.3 °F (36.3 °C)   TempSrc: Temporal   SpO2: 98%   Weight: 160 lb (72.6 kg)     Body mass index is 25.05 kg/m². Wt Readings from Last 3 Encounters:   07/01/21 160 lb (72.6 kg)   06/21/21 164 lb (74.4 kg)   06/16/21 156 lb (70.8 kg)     BP Readings from Last 3 Encounters:   07/01/21 120/70   06/21/21 (!) 110/58   06/16/21 114/60       Review of Systems   Constitutional: Positive for fatigue. HENT: Negative. Eyes: Negative. Respiratory: Negative. Cardiovascular: Negative. Gastrointestinal: Negative. Musculoskeletal: Negative. Skin: Negative. Neurological: Negative. Physical Exam  Constitutional:       Appearance: He is well-developed. HENT:      Head: Normocephalic. Right Ear: Tympanic membrane and external ear normal.      Left Ear: Tympanic membrane and external ear normal.      Nose: Nose normal.   Cardiovascular:      Rate and Rhythm: Normal rate and regular rhythm. Heart sounds: Normal heart sounds. No murmur heard. No friction rub. No gallop. Pulmonary:      Effort: Pulmonary effort is normal.      Breath sounds: Normal breath sounds. No wheezing or rales. Abdominal:      General: Bowel sounds are normal.      Palpations: Abdomen is soft. Tenderness: There is no abdominal tenderness. There is no guarding. Musculoskeletal:         General: Normal range of motion. Cervical back: Normal range of motion and neck supple. Lymphadenopathy:      Cervical: No cervical adenopathy. Skin:     General: Skin is warm. Coloration: Skin is pale. Neurological:      Mental Status: He is alert and oriented to person, place, and time. Deep Tendon Reflexes: Reflexes are normal and symmetric. Assessment/Plan:  1. Bacteremia  Pt following with dr Adelita Reed and getting hyperbaric tx  - [facility] DISCHARGE MEDS RECONCILED W/ CURRENT OUTPATIENT MED LIST    2. Anemia, unspecified type  Will recheck cbc. Make sure is stable heading into holiday weekend  - CBC Auto Differential; Future  - ferrous sulfate (FE TABS) 325 (65 Fe) MG EC tablet; Take 1 tablet by mouth 2 times daily  Dispense: 60 tablet; Refill: 5  - [facility] DISCHARGE MEDS RECONCILED W/ CURRENT OUTPATIENT MED LIST    3.  Abscess of abdominal wall  Cont meds and fu with ID  - [facility] DISCHARGE MEDS RECONCILED W/ CURRENT OUTPATIENT MED LIST        Medical Decision Making: high complexity

## 2021-07-02 LAB
BASOPHILS # BLD: 0.5 %
BASOPHILS ABSOLUTE: 0.1 THOU/MM3 (ref 0–0.1)
EOSINOPHIL # BLD: 1 %
EOSINOPHILS ABSOLUTE: 0.1 THOU/MM3 (ref 0–0.4)
ERYTHROCYTE [DISTWIDTH] IN BLOOD BY AUTOMATED COUNT: 15.7 % (ref 11.5–14.5)
ERYTHROCYTE [DISTWIDTH] IN BLOOD BY AUTOMATED COUNT: 48.4 FL (ref 35–45)
HCT VFR BLD CALC: 31.6 % (ref 42–52)
HEMOGLOBIN: 9.3 GM/DL (ref 14–18)
IMMATURE GRANS (ABS): 0.05 THOU/MM3 (ref 0–0.07)
IMMATURE GRANULOCYTES: 0.5 %
LYMPHOCYTES # BLD: 8.1 %
LYMPHOCYTES ABSOLUTE: 0.9 THOU/MM3 (ref 1–4.8)
MCH RBC QN AUTO: 25.4 PG (ref 26–33)
MCHC RBC AUTO-ENTMCNC: 29.4 GM/DL (ref 32.2–35.5)
MCV RBC AUTO: 86.3 FL (ref 80–94)
MONOCYTES # BLD: 9.6 %
MONOCYTES ABSOLUTE: 1.1 THOU/MM3 (ref 0.4–1.3)
NUCLEATED RED BLOOD CELLS: 0 /100 WBC
PLATELET # BLD: 390 THOU/MM3 (ref 130–400)
PMV BLD AUTO: 10.2 FL (ref 9.4–12.4)
RBC # BLD: 3.66 MILL/MM3 (ref 4.7–6.1)
SEG NEUTROPHILS: 80.3 %
SEGMENTED NEUTROPHILS ABSOLUTE COUNT: 8.8 THOU/MM3 (ref 1.8–7.7)
WBC # BLD: 11 THOU/MM3 (ref 4.8–10.8)

## 2021-07-09 LAB
BASOPHILS ABSOLUTE: 0.1 /ΜL
BASOPHILS RELATIVE PERCENT: 1 %
EOSINOPHILS ABSOLUTE: 0.3 /ΜL
EOSINOPHILS RELATIVE PERCENT: 5.1 %
HCT VFR BLD CALC: 29.3 % (ref 41–53)
HEMOGLOBIN: 9.6 G/DL (ref 13.5–17.5)
LYMPHOCYTES ABSOLUTE: 0.9 /ΜL
LYMPHOCYTES RELATIVE PERCENT: 14.7 %
MCH RBC QN AUTO: 25.5 PG
MCHC RBC AUTO-ENTMCNC: 32.6 G/DL
MCV RBC AUTO: 78.3 FL
MONOCYTES ABSOLUTE: 0.6 /ΜL
MONOCYTES RELATIVE PERCENT: 9.9 %
NEUTROPHILS ABSOLUTE: 4.2 /ΜL
NEUTROPHILS RELATIVE PERCENT: 69.3 %
PLATELET # BLD: 320 K/ΜL
PMV BLD AUTO: 7 FL
RBC # BLD: 3.75 10^6/ΜL
WBC # BLD: 6.1 10^3/ML

## 2021-07-16 LAB
ALBUMIN SERPL-MCNC: 3.3 G/DL
ALP BLD-CCNC: 77 U/L
ALT SERPL-CCNC: 22 U/L
ANION GAP SERPL CALCULATED.3IONS-SCNC: 11 MMOL/L
AST SERPL-CCNC: 14 U/L
BASOPHILS ABSOLUTE: 0 /ΜL
BASOPHILS RELATIVE PERCENT: 0.8 %
BILIRUB SERPL-MCNC: 0.4 MG/DL (ref 0.1–1.4)
BUN BLDV-MCNC: 17 MG/DL
CALCIUM SERPL-MCNC: 8.8 MG/DL
CHLORIDE BLD-SCNC: 103 MMOL/L
CO2: 26 MMOL/L
CREAT SERPL-MCNC: 1 MG/DL
EOSINOPHILS ABSOLUTE: 0.2 /ΜL
EOSINOPHILS RELATIVE PERCENT: 3.5 %
GFR CALCULATED: >60
GLUCOSE BLD-MCNC: 110 MG/DL
HCT VFR BLD CALC: 30.7 % (ref 41–53)
HEMOGLOBIN: 9.8 G/DL (ref 13.5–17.5)
LYMPHOCYTES ABSOLUTE: 0.9 /ΜL
LYMPHOCYTES RELATIVE PERCENT: 15.4 %
MCH RBC QN AUTO: 25.1 PG
MCHC RBC AUTO-ENTMCNC: 32 G/DL
MCV RBC AUTO: 78.5 FL
MONOCYTES ABSOLUTE: 0.6 /ΜL
MONOCYTES RELATIVE PERCENT: 9.7 %
NEUTROPHILS ABSOLUTE: 4 /ΜL
NEUTROPHILS RELATIVE PERCENT: 70.6 %
PDW BLD-RTO: 17.1 %
PLATELET # BLD: 285 K/ΜL
PMV BLD AUTO: 6.8 FL
POTASSIUM SERPL-SCNC: 4.3 MMOL/L
RBC # BLD: 3.91 10^6/ΜL
SEDIMENTATION RATE, ERYTHROCYTE: 70
SODIUM BLD-SCNC: 136 MMOL/L
TOTAL PROTEIN: 7.2
WBC # BLD: 5.7 10^3/ML

## 2021-07-30 ENCOUNTER — HOSPITAL ENCOUNTER (OUTPATIENT)
Age: 76
Discharge: HOME | End: 2021-07-30
Payer: MEDICARE

## 2021-07-30 LAB
ALBUMIN SERPL-MCNC: 4 G/DL (ref 3.5–5.1)
ALP BLD-CCNC: 93 U/L (ref 38–126)
ALT SERPL-CCNC: 13 U/L (ref 11–66)
ANION GAP SERPL CALCULATED.3IONS-SCNC: 11 MEQ/L (ref 8–16)
AST SERPL-CCNC: 17 U/L (ref 5–40)
BILIRUB SERPL-MCNC: 0.4 MG/DL (ref 0.3–1.2)
BUN BLDV-MCNC: 21 MG/DL (ref 7–22)
C-REACTIVE PROTEIN: 2.41 MG/DL (ref 0–1)
CALCIUM SERPL-MCNC: 9.3 MG/DL (ref 8.5–10.5)
CHLORIDE BLD-SCNC: 104 MEQ/L (ref 98–111)
CO2: 24 MEQ/L (ref 23–33)
CREAT SERPL-MCNC: 1.1 MG/DL (ref 0.4–1.2)
ERYTHROCYTE [DISTWIDTH] IN BLOOD BY AUTOMATED COUNT: 17.4 % (ref 11.5–14.5)
ERYTHROCYTE [DISTWIDTH] IN BLOOD BY AUTOMATED COUNT: 54.2 FL (ref 35–45)
GFR SERPL CREATININE-BSD FRML MDRD: 65 ML/MIN/1.73M2
GLUCOSE BLD-MCNC: 96 MG/DL (ref 70–108)
HCT VFR BLD CALC: 37.6 % (ref 42–52)
HEMOGLOBIN: 11.2 GM/DL (ref 14–18)
MCH RBC QN AUTO: 25.6 PG (ref 26–33)
MCHC RBC AUTO-ENTMCNC: 29.8 GM/DL (ref 32.2–35.5)
MCV RBC AUTO: 85.8 FL (ref 80–94)
PLATELET # BLD: 240 THOU/MM3 (ref 130–400)
PMV BLD AUTO: 9.6 FL (ref 9.4–12.4)
POTASSIUM SERPL-SCNC: 4.7 MEQ/L (ref 3.5–5.2)
PROSTATE SPECIFIC ANTIGEN: < 0.02 NG/ML (ref 0–1)
RBC # BLD: 4.38 MILL/MM3 (ref 4.7–6.1)
SODIUM BLD-SCNC: 139 MEQ/L (ref 135–145)
TOTAL PROTEIN: 7.5 G/DL (ref 6.1–8)
WBC # BLD: 5.8 THOU/MM3 (ref 4.8–10.8)

## 2021-07-30 PROCEDURE — 84403 ASSAY OF TOTAL TESTOSTERONE: CPT

## 2021-07-30 PROCEDURE — 84153 ASSAY OF PSA TOTAL: CPT

## 2021-07-30 PROCEDURE — 86140 C-REACTIVE PROTEIN: CPT

## 2021-07-30 PROCEDURE — 80053 COMPREHEN METABOLIC PANEL: CPT

## 2021-07-30 PROCEDURE — 85027 COMPLETE CBC AUTOMATED: CPT

## 2021-07-30 PROCEDURE — 36415 COLL VENOUS BLD VENIPUNCTURE: CPT

## 2021-08-02 LAB — TESTOSTERONE TOTAL: 435 NG/DL (ref 300–720)

## 2021-09-07 ENCOUNTER — TELEPHONE (OUTPATIENT)
Dept: FAMILY MEDICINE CLINIC | Age: 76
End: 2021-09-07

## 2021-09-07 ENCOUNTER — NURSE ONLY (OUTPATIENT)
Dept: LAB | Age: 76
End: 2021-09-07

## 2021-09-07 DIAGNOSIS — D64.9 ANEMIA, UNSPECIFIED TYPE: ICD-10-CM

## 2021-09-07 DIAGNOSIS — D64.9 ANEMIA, UNSPECIFIED TYPE: Primary | ICD-10-CM

## 2021-09-07 NOTE — TELEPHONE ENCOUNTER
Pt wife called requesting lab orders for metal detection. Mainly looking for cobalt and chromium.       She also wanted to let you know pt had left hip biopsy at [facility] 8/13/21

## 2021-09-10 ENCOUNTER — TELEPHONE (OUTPATIENT)
Dept: FAMILY MEDICINE CLINIC | Age: 76
End: 2021-09-10

## 2021-09-10 ENCOUNTER — NURSE ONLY (OUTPATIENT)
Dept: LAB | Age: 76
End: 2021-09-10

## 2021-09-10 DIAGNOSIS — D64.9 ANEMIA, UNSPECIFIED TYPE: ICD-10-CM

## 2021-09-10 DIAGNOSIS — R78.81 BACTEREMIA: Primary | ICD-10-CM

## 2021-09-10 NOTE — TELEPHONE ENCOUNTER
Lab called needing order for the heavy metal testing to be in Epic due to the specimen being rejected yesterday. orders dropped.

## 2021-09-17 ENCOUNTER — NURSE ONLY (OUTPATIENT)
Dept: LAB | Age: 76
End: 2021-09-17

## 2021-09-20 ENCOUNTER — TELEPHONE (OUTPATIENT)
Dept: FAMILY MEDICINE CLINIC | Age: 76
End: 2021-09-20

## 2021-09-20 NOTE — TELEPHONE ENCOUNTER
Sepideh Cotton calling from [facility] was wondering about pts lab heavy metals order.   He was wondering if it's for occupational exposure of some sort or just symptomatic of heavy metal exposure and nothing to do with what his occupation is    Dominik's CB is [phone]

## 2021-09-22 LAB — HEAVY METAL: NORMAL

## 2021-10-21 DIAGNOSIS — I10 ESSENTIAL HYPERTENSION: ICD-10-CM

## 2021-10-21 RX ORDER — LISINOPRIL 10 MG/1
10 TABLET ORAL DAILY
Qty: 90 TABLET | Refills: 1 | Status: SHIPPED | OUTPATIENT
Start: 2021-10-21 | End: 2022-06-28 | Stop reason: SDUPTHER

## 2022-01-05 NOTE — TELEPHONE ENCOUNTER
Patient had appt with Catherine Figueredo 6/21/21
Pt spouse calling to update pt status. Pt was admitted there last night into the ICU, [facility]. He started last night with chills/sweats, vomiting, confusion and trouble walking.
No

## 2022-04-05 ENCOUNTER — TELEPHONE (OUTPATIENT)
Dept: FAMILY MEDICINE CLINIC | Age: 77
End: 2022-04-05

## 2022-04-05 NOTE — TELEPHONE ENCOUNTER
From the radiology report, suggest is similar to scan from last year.   Possible loose hardware or particle disease (particle disease is where loose fragments from a hip replacement trigger inflammation around the joint)

## 2022-04-05 NOTE — TELEPHONE ENCOUNTER
Pt wants you to please review his hip xray that he had done at [facility]. It is scanned in media.

## 2022-04-05 NOTE — TELEPHONE ENCOUNTER
I would defer to his hip surgeon. Easy thing to say would be to put in another hip replacement. With that however is significant risk of infection/poor healing/ or even making the inflammation worse.

## 2022-06-28 DIAGNOSIS — I10 ESSENTIAL HYPERTENSION: ICD-10-CM

## 2022-06-28 RX ORDER — LISINOPRIL 10 MG/1
10 TABLET ORAL DAILY
Qty: 90 TABLET | Refills: 1 | Status: SHIPPED | OUTPATIENT
Start: 2022-06-28

## 2022-06-28 NOTE — LETTER
Christie Woodall [address] [address]  Phone: [phone]  Fax: [phone]    AXEL Escalera - GOVIND        June 28, 2022     Patient: Janelle Alexander   YOB: 1945   Date of Visit: 6/28/2022       To Whom it May Concern:    Kriss Deng is on lisinopril for control of blood pressure    If you have any questions or concerns, please don't hesitate to call.     Sincerely,         AXEL Escalera - CNP

## 2022-07-19 ENCOUNTER — OFFICE VISIT (OUTPATIENT)
Dept: FAMILY MEDICINE CLINIC | Age: 77
End: 2022-07-19
Payer: MEDICARE

## 2022-07-19 VITALS
BODY MASS INDEX: 26.06 KG/M2 | TEMPERATURE: 97.4 F | RESPIRATION RATE: 16 BRPM | DIASTOLIC BLOOD PRESSURE: 74 MMHG | OXYGEN SATURATION: 97 % | SYSTOLIC BLOOD PRESSURE: 114 MMHG | HEART RATE: 76 BPM | WEIGHT: 166 LBS | HEIGHT: 67 IN

## 2022-07-19 DIAGNOSIS — M25.551 PAIN OF RIGHT HIP JOINT: ICD-10-CM

## 2022-07-19 DIAGNOSIS — R01.1 MURMUR: ICD-10-CM

## 2022-07-19 DIAGNOSIS — Z00.00 MEDICARE ANNUAL WELLNESS VISIT, SUBSEQUENT: Primary | ICD-10-CM

## 2022-07-19 DIAGNOSIS — C61 PROSTATE CANCER (HCC): ICD-10-CM

## 2022-07-19 DIAGNOSIS — I10 ESSENTIAL HYPERTENSION: ICD-10-CM

## 2022-07-19 PROCEDURE — 1123F ACP DISCUSS/DSCN MKR DOCD: CPT | Performed by: NURSE PRACTITIONER

## 2022-07-19 PROCEDURE — 3017F COLORECTAL CA SCREEN DOC REV: CPT | Performed by: NURSE PRACTITIONER

## 2022-07-19 PROCEDURE — G0439 PPPS, SUBSEQ VISIT: HCPCS | Performed by: NURSE PRACTITIONER

## 2022-07-19 SDOH — ECONOMIC STABILITY: FOOD INSECURITY: WITHIN THE PAST 12 MONTHS, THE FOOD YOU BOUGHT JUST DIDN'T LAST AND YOU DIDN'T HAVE MONEY TO GET MORE.: NEVER TRUE

## 2022-07-19 SDOH — ECONOMIC STABILITY: FOOD INSECURITY: WITHIN THE PAST 12 MONTHS, YOU WORRIED THAT YOUR FOOD WOULD RUN OUT BEFORE YOU GOT MONEY TO BUY MORE.: NEVER TRUE

## 2022-07-19 ASSESSMENT — PATIENT HEALTH QUESTIONNAIRE - PHQ9
SUM OF ALL RESPONSES TO PHQ QUESTIONS 1-9: 0
2. FEELING DOWN, DEPRESSED OR HOPELESS: 0
SUM OF ALL RESPONSES TO PHQ QUESTIONS 1-9: 0
SUM OF ALL RESPONSES TO PHQ9 QUESTIONS 1 & 2: 0
SUM OF ALL RESPONSES TO PHQ QUESTIONS 1-9: 0
1. LITTLE INTEREST OR PLEASURE IN DOING THINGS: 0
SUM OF ALL RESPONSES TO PHQ QUESTIONS 1-9: 0

## 2022-07-19 ASSESSMENT — SOCIAL DETERMINANTS OF HEALTH (SDOH): HOW HARD IS IT FOR YOU TO PAY FOR THE VERY BASICS LIKE FOOD, HOUSING, MEDICAL CARE, AND HEATING?: NOT HARD AT ALL

## 2022-07-19 ASSESSMENT — LIFESTYLE VARIABLES
HOW OFTEN DO YOU HAVE A DRINK CONTAINING ALCOHOL: 2-3 TIMES A WEEK
HOW MANY STANDARD DRINKS CONTAINING ALCOHOL DO YOU HAVE ON A TYPICAL DAY: 1 OR 2

## 2022-07-19 NOTE — PROGRESS NOTES
Medicare Annual Wellness Visit    Brunilda Peñaloza is here for Medicare AWV    Assessment & Plan   Medicare annual wellness visit, subsequent  Prostate cancer [facility])  Essential hypertension  -     Lipid Panel; Future  Pain of right hip joint  Murmur    Recommendations for Preventive Services Due: see orders and patient instructions/AVS.  Recommended screening schedule for the next 5-10 years is provided to the patient in written form: see Patient Instructions/AVS.     Return for Medicare Annual Wellness Visit in 1 year. Subjective   The following acute and/or chronic problems were also addressed today:  Reviewed recent [facility] changes. 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 Bp doing well. Prostate cancer is doing well    Patient's complete Health Risk Assessment and screening values have been reviewed and are found in Flowsheets. The following problems were reviewed today and where indicated follow up appointments were made and/or referrals ordered.     Positive Risk Factor Screenings with Interventions:             General Health and ACP:  General  In general, how would you say your health is?: Good  In the past 7 days, have you experienced any of the following: New or Increased Pain, New or Increased Fatigue, Loneliness, Social Isolation, Stress or Anger?: No  Do you get the social and emotional support that you need?: Yes  Do you have a Living Will?: Yes    Advance Directives       Power of  Living Will ACP-Advance Directive ACP-Power of     Not on File Not on File Not on File Filed        General Health Risk Interventions:  Pt does hernandez chronic hip pain    Health Habits/Nutrition:  Physical Activity: Sufficiently Active    Days of Exercise per Week: 5 days    Minutes of Exercise per Session: 30 min     Have you lost any weight without trying in the past 3 months?: No  Body mass index: (!) 25.99  Have you seen the dentist within the past year?: (!) No  Health Habits/Nutrition Interventions:  Inadequate physical activity:  patient is not ready to increase his/her physical activity level at this time    Hearing/Vision:  Do you or your family notice any trouble with your hearing that hasn't been managed with hearing aids?: No  Do you have difficulty driving, watching TV, or doing any of your daily activities because of your eyesight?: No  Have you had an eye exam within the past year?: (!) No  No results found. Hearing/Vision Interventions:  Vision concerns:  patient encouraged to make appointment with his/her eye specialist            Objective   Vitals:    07/19/22 0815   BP: 114/74   Site: Right Upper Arm   Position: Sitting   Cuff Size: Medium Adult   Pulse: 76   Resp: 16   Temp: 97.4 °F (36.3 °C)   TempSrc: Temporal   SpO2: 97%   Weight: 166 lb (75.3 kg)   Height: 5' 7.01\" (1.702 m)      Body mass index is 25.99 kg/m². General Appearance: alert and oriented to person, place and time, well-developed and well-nourished, in no acute distress  Skin: warm and dry, no rash or erythema  Head: normocephalic and atraumatic  ENT: tympanic membrane, external ear and ear canal normal bilaterally, oropharynx clear and moist with normal mucous membranes  Neck: neck supple and non tender without mass, no thyromegaly or thyroid nodules, no cervical lymphadenopathy   Pulmonary/Chest: clear to auscultation bilaterally- no wheezes, rales or rhonchi, normal air movement, no respiratory distress  Cardiovascular: normal rate, no gallops, intact distal pulses, no carotid bruits, and systolic murmur present- 4/6 at lower left sternal border  Musculoskeletal: limited rom of right hip. Using cane       No Known Allergies  Prior to Visit Medications    Medication Sig Taking?  Authorizing Provider   lisinopril (PRINIVIL;ZESTRIL) 10 MG tablet Take 1 tablet by mouth daily Yes AXEL Gerardo - CNP   Ascorbic Acid (VITAMIN C) 1000 MG tablet Take 1,000 mg by mouth daily Yes Historical Provider, MD Cholecalciferol (VITAMIN D3) 125 MCG (5000 UT) CAPS Take 1 capsule by mouth daily Yes Historical Provider, MD   Zinc 30 MG CAPS Take 1 capsule by mouth daily  Yes Historical Provider, MD   Probiotic Product (PROBIOTIC DAILY PO) Take 1 tablet by mouth daily  Yes Historical Provider, MD   Multiple Vitamin (ONE-A-DAY MENS PO) Take by mouth Yes Historical Provider, MD       CareTeam (Including outside providers/suppliers regularly involved in providing care):   Patient Care Team:  AXEL Nina CNP as PCP - General (Nurse Practitioner)  AXEL Nina CNP as PCP - [facility] Empaneled Provider     Reviewed and updated this visit:  Tobacco  Allergies  Meds  Med Hx  Surg Hx  Soc Hx  Fam Hx         Diagnosis Orders   1. Medicare annual wellness visit, subsequent        2. Prostate cancer ([facility] [address]        3. Essential hypertension  Lipid Panel      4. Pain of right hip joint        5. Murmur          Pt reports [facility] monitors his murmur. Sound is greater today than before. Advised to remind them to check at next visit. Pt is on doxy chronically now for hip infection.   Discussed routine labs  To get lipids done  Cont bp med

## 2023-05-30 DIAGNOSIS — I10 ESSENTIAL HYPERTENSION: ICD-10-CM

## 2023-05-30 RX ORDER — LISINOPRIL 10 MG/1
10 TABLET ORAL DAILY
Qty: 90 TABLET | Refills: 1 | Status: SHIPPED | OUTPATIENT
Start: 2023-05-30

## 2023-07-11 ENCOUNTER — TELEPHONE (OUTPATIENT)
Dept: FAMILY MEDICINE CLINIC | Age: 78
End: 2023-07-11

## 2023-07-11 DIAGNOSIS — I10 ESSENTIAL HYPERTENSION: ICD-10-CM

## 2023-07-11 DIAGNOSIS — C61 PROSTATE CANCER (HCC): Primary | ICD-10-CM

## 2023-07-11 NOTE — TELEPHONE ENCOUNTER
Pt oncology wants you to order blood work for him. I already see the lipid in there and pt is aware to fast.    Oncology wants PSA, testosterone and cmp. Orders pending. Any thing else? Pt is aware to get done fasting tomorrow.

## 2023-07-12 ENCOUNTER — NURSE ONLY (OUTPATIENT)
Dept: LAB | Age: 78
End: 2023-07-12

## 2023-07-12 DIAGNOSIS — I10 ESSENTIAL HYPERTENSION: ICD-10-CM

## 2023-07-12 DIAGNOSIS — C61 PROSTATE CANCER (HCC): ICD-10-CM

## 2023-07-12 LAB
ALBUMIN SERPL BCG-MCNC: 3.9 G/DL (ref 3.5–5.1)
ALP SERPL-CCNC: 109 U/L (ref 38–126)
ALT SERPL W/O P-5'-P-CCNC: 17 U/L (ref 11–66)
ANION GAP SERPL CALC-SCNC: 12 MEQ/L (ref 8–16)
AST SERPL-CCNC: 22 U/L (ref 5–40)
BASOPHILS ABSOLUTE: 0 THOU/MM3 (ref 0–0.1)
BASOPHILS NFR BLD AUTO: 0.6 %
BILIRUB SERPL-MCNC: 0.7 MG/DL (ref 0.3–1.2)
BUN SERPL-MCNC: 20 MG/DL (ref 7–22)
CALCIUM SERPL-MCNC: 9 MG/DL (ref 8.5–10.5)
CHLORIDE SERPL-SCNC: 106 MEQ/L (ref 98–111)
CHOLEST SERPL-MCNC: 155 MG/DL (ref 100–199)
CO2 SERPL-SCNC: 21 MEQ/L (ref 23–33)
CREAT SERPL-MCNC: 1 MG/DL (ref 0.4–1.2)
CRP SERPL-MCNC: 0.75 MG/DL (ref 0–1)
DEPRECATED RDW RBC AUTO: 43.6 FL (ref 35–45)
EOSINOPHIL NFR BLD AUTO: 3.4 %
EOSINOPHILS ABSOLUTE: 0.2 THOU/MM3 (ref 0–0.4)
ERYTHROCYTE [DISTWIDTH] IN BLOOD BY AUTOMATED COUNT: 13 % (ref 11.5–14.5)
ERYTHROCYTE [SEDIMENTATION RATE] IN BLOOD BY WESTERGREN METHOD: 14 MM/HR (ref 0–10)
GFR SERPL CREATININE-BSD FRML MDRD: > 60 ML/MIN/1.73M2
GLUCOSE SERPL-MCNC: 89 MG/DL (ref 70–108)
HCT VFR BLD AUTO: 44.4 % (ref 42–52)
HDLC SERPL-MCNC: 67 MG/DL
HGB BLD-MCNC: 14.4 GM/DL (ref 14–18)
IMM GRANULOCYTES # BLD AUTO: 0.01 THOU/MM3 (ref 0–0.07)
IMM GRANULOCYTES NFR BLD AUTO: 0.2 %
LDLC SERPL CALC-MCNC: 74 MG/DL
LYMPHOCYTES ABSOLUTE: 1 THOU/MM3 (ref 1–4.8)
LYMPHOCYTES NFR BLD AUTO: 19.7 %
MCH RBC QN AUTO: 29.6 PG (ref 26–33)
MCHC RBC AUTO-ENTMCNC: 32.4 GM/DL (ref 32.2–35.5)
MCV RBC AUTO: 91.4 FL (ref 80–94)
MONOCYTES ABSOLUTE: 0.5 THOU/MM3 (ref 0.4–1.3)
MONOCYTES NFR BLD AUTO: 10.2 %
NEUTROPHILS NFR BLD AUTO: 65.9 %
NRBC BLD AUTO-RTO: 0 /100 WBC
PLATELET # BLD AUTO: 184 THOU/MM3 (ref 130–400)
PMV BLD AUTO: 10.5 FL (ref 9.4–12.4)
POTASSIUM SERPL-SCNC: 4.4 MEQ/L (ref 3.5–5.2)
PROT SERPL-MCNC: 7 G/DL (ref 6.1–8)
PSA SERPL-MCNC: < 0.02 NG/ML (ref 0–1)
RBC # BLD AUTO: 4.86 MILL/MM3 (ref 4.7–6.1)
SEGMENTED NEUTROPHILS ABSOLUTE COUNT: 3.3 THOU/MM3 (ref 1.8–7.7)
SODIUM SERPL-SCNC: 139 MEQ/L (ref 135–145)
TRIGL SERPL-MCNC: 72 MG/DL (ref 0–199)
WBC # BLD AUTO: 5 THOU/MM3 (ref 4.8–10.8)

## 2023-07-14 LAB — TESTOST SERPL-MCNC: 372 NG/DL (ref 300–720)
